# Patient Record
Sex: FEMALE | Race: WHITE | Employment: FULL TIME | URBAN - METROPOLITAN AREA
[De-identification: names, ages, dates, MRNs, and addresses within clinical notes are randomized per-mention and may not be internally consistent; named-entity substitution may affect disease eponyms.]

---

## 2021-04-27 PROBLEM — M51.36 DDD (DEGENERATIVE DISC DISEASE), LUMBAR: Status: ACTIVE | Noted: 2021-04-27

## 2021-04-27 PROBLEM — M54.16 LUMBAR RADICULOPATHY: Status: ACTIVE | Noted: 2021-04-27

## 2021-04-27 PROBLEM — E66.01 MORBID OBESITY (HCC): Status: ACTIVE | Noted: 2021-04-27

## 2021-05-05 ENCOUNTER — HOSPITAL ENCOUNTER (OUTPATIENT)
Dept: CT IMAGING | Age: 51
Discharge: HOME | End: 2021-05-05
Attending: NEUROLOGICAL SURGERY
Payer: COMMERCIAL

## 2021-05-05 ENCOUNTER — HOSPITAL ENCOUNTER (OUTPATIENT)
Dept: INTERVENTIONAL RADIOLOGY/VASCULAR | Age: 51
Discharge: HOME | End: 2021-05-05
Attending: NEUROLOGICAL SURGERY
Payer: COMMERCIAL

## 2021-05-05 VITALS
HEART RATE: 78 BPM | DIASTOLIC BLOOD PRESSURE: 76 MMHG | OXYGEN SATURATION: 99 % | TEMPERATURE: 97.4 F | RESPIRATION RATE: 18 BRPM | SYSTOLIC BLOOD PRESSURE: 169 MMHG

## 2021-05-05 DIAGNOSIS — E66.01 MORBID OBESITY (HCC): ICD-10-CM

## 2021-05-05 DIAGNOSIS — M51.36 DDD (DEGENERATIVE DISC DISEASE), LUMBAR: ICD-10-CM

## 2021-05-05 DIAGNOSIS — M54.16 LUMBAR RADICULOPATHY: ICD-10-CM

## 2021-05-05 PROCEDURE — 77030003666 HC NDL SPINAL BD -A

## 2021-05-05 PROCEDURE — 74011000250 HC RX REV CODE- 250: Performed by: PHYSICIAN ASSISTANT

## 2021-05-05 PROCEDURE — 62304 MYELOGRAPHY LUMBAR INJECTION: CPT

## 2021-05-05 PROCEDURE — 77030014143 HC TY PUNC LUMBR BD -A

## 2021-05-05 PROCEDURE — 74011000636 HC RX REV CODE- 636: Performed by: PHYSICIAN ASSISTANT

## 2021-05-05 PROCEDURE — 72132 CT LUMBAR SPINE W/DYE: CPT

## 2021-05-05 RX ORDER — LIDOCAINE HYDROCHLORIDE 20 MG/ML
1-10 INJECTION, SOLUTION INFILTRATION; PERINEURAL
Status: DISCONTINUED | OUTPATIENT
Start: 2021-05-05 | End: 2021-05-09 | Stop reason: HOSPADM

## 2021-05-05 RX ADMIN — LIDOCAINE HYDROCHLORIDE 4 ML: 20 INJECTION, SOLUTION INFILTRATION; PERINEURAL at 08:15

## 2021-05-05 RX ADMIN — IOPAMIDOL 15 ML: 408 INJECTION, SOLUTION INTRATHECAL at 08:17

## 2021-05-05 NOTE — DISCHARGE INSTRUCTIONS
[address]  Department of Interventional Radiology  [facility]  [phone] Office  [phone] Fax  POST LUMBAR PUNCTURE/MYELOGRAM/INTRATHECAL CHEMOTHERAPY DISCHARGE INSTRUCTIONS  General Information:  Lumbar Puncture: A LP is done to help diagnose several disorders, like pseudo tumor, migraines, meningitis, and multiple sclerosis. It involves a puncture (usually in the lower spine) into the sac that protects the spinal column. A sample of the fluid in that space is removed and tested in the lab. Myelogram:   A Myelogram involves a lumbar puncture, and instead of removing fluid, contrast will be injected into the sac surrounding the spinal column. It is done to visualize the spinal column, nerve roots, spinal canal, vertebral discs and disc space. It is usually done to diagnose back pain with unknown cause or in preparation for surgery. After the injection, a CT scan will be done, usually within two hours of the injection. Intrathecal Chemotherapy:   Chemotherapy can be given in many forms. Intrathecal chemo involves a lumbar puncture, and instead of removing fluid, the chemo will be injected into the space. After any of these procedures, you will be asked to lie flat on your back for 4-6 hours to prevent complications. You should also rest for 24 hours after you go home, and force fluids. If you have a headache, you should take Tylenol or acetaminophen. Call If:   You should call your Physician and/or the Radiology Nurse if you develop a headache that is not relieved by Tylenol, and worsens when you stand and eases when you lie down, you need to call. You may have developed what is referred to as a spinal headache. Our physicians will probably advise you to be on strict bed rest for 24 hours, to drink lots of fluids and caffeine. If this does not help the head pain, call again the next day.  You should call if you have bleeding other than a small spot on your bandage. You should call if you have any numbness, tingling, weakness, fever, chills, urinary retention, severe itching, rash, welts, swelling, or confusion. Follow-up Instructions: See the doctor who ordered your procedure as he/she has instructed. If you had a Lumbar Puncture or Myelogram, your results should be available to your ordering doctor in 3-5 business days. You can remove your dressing in 24 hours and shower regularly. Do not bathe or swim for 72 hours. To Reach Us: If you have any questions about your procedure, please call the Interventional Radiology department at [phone]. After business hours (5pm) and weekends, call the answering service at [phone] and ask for the Radiologist on call to be paged. Si tiene Preguntas acerca del procedimiento, por favor llame al departamento de Radiología Intervencional al [phone]. Después de horas de oficina (5 pm) y los fines de [place], llamar al Cornelius Cortez al [phone] y pregunte por el Radiologo de [facility]. Interventional Radiology General Nurse Discharge    After general anesthesia or intravenous sedation, for 24 hours or while taking prescription Narcotics:  · Limit your activities  · Do not drive and operate hazardous machinery  · Do not make important personal or business decisions  · Do  not drink alcoholic beverages  · If you have not urinated within 8 hours after discharge, please contact your surgeon on call. * Please give a list of your current medications to your Primary Care Provider. * Please update this list whenever your medications are discontinued, doses are     changed, or new medications (including over-the-counter products) are added. * Please carry medication information at all times in case of emergency situations.     These are general instructions for a healthy lifestyle:    No smoking/ No tobacco products/ Avoid exposure to second hand smoke  Surgeon Solis Howard Warning:  Quitting smoking now greatly reduces serious risk to your health. Obesity, smoking, and sedentary lifestyle greatly increases your risk for illness  A healthy diet, regular physical exercise & weight monitoring are important for maintaining a healthy lifestyle    You may be retaining fluid if you have a history of heart failure or if you experience any of the following symptoms:  Weight gain of 3 pounds or more overnight or 5 pounds in a week, increased swelling in our hands or feet or shortness of breath while lying flat in bed. Please call your doctor as soon as you notice any of these symptoms; do not wait until your next office visit. Recognize signs and symptoms of STROKE:  F-face looks uneven    A-arms unable to move or move unevenly    S-speech slurred or non-existent    T-time-call 911 as soon as signs and symptoms begin-DO NOT go       Back to bed or wait to see if you get better-TIME IS BRAIN.     Patient Signature:  Date: 5/5/2021  Discharging Nurse: Jared Carson RN

## 2021-05-05 NOTE — PROGRESS NOTES
TRANSFER - OUT REPORT:    Verbal report given to Tyron Ochoa RN(name) on [place]  being transferred to IR recovery(unit) for routine progression of care. Report consisted of patients Situation, Background, Assessment and   Recommendations(SBAR). Information from the following report(s) SBAR, Procedure Summary and MAR was reviewed with the receiving nurse. Opportunity for questions and clarification was provided. Pt tolerated procedure well.      Visit Vitals  BP (!) 169/76 (BP 1 Location: Right upper arm, BP Patient Position: At rest)   Pulse 78   Temp 97.4 °F (36.3 °C)   Resp 18   SpO2 99%   Breastfeeding No     Past Medical History:   Diagnosis Date    Celiac disease     Endometriosis     Headache     Lyme disease     Migraines     Vertigo

## 2021-05-05 NOTE — PROGRESS NOTES
Recovery period without difficulty. Pt alert and oriented and denies pain. Dressing is clean, dry, and intact. Reviewed discharge instructions with patient, verbalized understanding. Pt escorted to lobby discharge area via wheelchair. Vital signs and Ilene score completed.

## 2021-06-10 ENCOUNTER — TRANSCRIBE ORDER (OUTPATIENT)
Dept: SCHEDULING | Age: 51
End: 2021-06-10

## 2021-06-10 DIAGNOSIS — Z12.31 VISIT FOR SCREENING MAMMOGRAM: Primary | ICD-10-CM

## 2021-06-11 ENCOUNTER — TRANSCRIBE ORDER (OUTPATIENT)
Dept: SCHEDULING | Age: 51
End: 2021-06-11

## 2021-06-11 DIAGNOSIS — Z12.31 VISIT FOR SCREENING MAMMOGRAM: Primary | ICD-10-CM

## 2021-06-15 ENCOUNTER — HOSPITAL ENCOUNTER (OUTPATIENT)
Dept: MAMMOGRAPHY | Age: 51
Discharge: HOME | End: 2021-06-15
Payer: COMMERCIAL

## 2021-06-15 DIAGNOSIS — Z12.31 VISIT FOR SCREENING MAMMOGRAM: ICD-10-CM

## 2021-06-15 PROCEDURE — 77067 SCR MAMMO BI INCL CAD: CPT

## 2022-03-18 PROBLEM — M51.36 DDD (DEGENERATIVE DISC DISEASE), LUMBAR: Status: ACTIVE | Noted: 2021-04-27

## 2022-03-18 PROBLEM — M51.369 DDD (DEGENERATIVE DISC DISEASE), LUMBAR: Status: ACTIVE | Noted: 2021-04-27

## 2022-03-18 PROBLEM — M54.16 LUMBAR RADICULOPATHY: Status: ACTIVE | Noted: 2021-04-27

## 2022-03-19 PROBLEM — E66.01 MORBID OBESITY (HCC): Status: ACTIVE | Noted: 2021-04-27

## 2022-06-18 ENCOUNTER — HOSPITAL ENCOUNTER (OUTPATIENT)
Dept: MAMMOGRAPHY | Age: 52
Discharge: HOME | End: 2022-06-21
Payer: COMMERCIAL

## 2022-06-18 DIAGNOSIS — Z12.31 SCREENING MAMMOGRAM FOR HIGH-RISK PATIENT: ICD-10-CM

## 2022-06-18 PROCEDURE — 77067 SCR MAMMO BI INCL CAD: CPT

## 2022-06-23 ENCOUNTER — OFFICE VISIT (OUTPATIENT)
Dept: SURGERY | Age: 52
End: 2022-06-23
Payer: COMMERCIAL

## 2022-06-23 VITALS
DIASTOLIC BLOOD PRESSURE: 64 MMHG | BODY MASS INDEX: 45.99 KG/M2 | SYSTOLIC BLOOD PRESSURE: 125 MMHG | HEART RATE: 83 BPM | WEIGHT: 293 LBS | HEIGHT: 67 IN

## 2022-06-23 DIAGNOSIS — Z01.812 ENCOUNTER FOR PRE-OPERATIVE LABORATORY TESTING: ICD-10-CM

## 2022-06-23 DIAGNOSIS — M54.50 CHRONIC BILATERAL LOW BACK PAIN WITHOUT SCIATICA: ICD-10-CM

## 2022-06-23 DIAGNOSIS — G89.29 CHRONIC BILATERAL LOW BACK PAIN WITHOUT SCIATICA: ICD-10-CM

## 2022-06-23 DIAGNOSIS — Z87.19 HX OF GASTROESOPHAGEAL REFLUX (GERD): ICD-10-CM

## 2022-06-23 DIAGNOSIS — G43.919 INTRACTABLE MIGRAINE WITHOUT STATUS MIGRAINOSUS, UNSPECIFIED MIGRAINE TYPE: ICD-10-CM

## 2022-06-23 DIAGNOSIS — E66.01 MORBID OBESITY (HCC): ICD-10-CM

## 2022-06-23 DIAGNOSIS — I49.3 PVC (PREMATURE VENTRICULAR CONTRACTION): ICD-10-CM

## 2022-06-23 DIAGNOSIS — Z98.890 H/O SPINAL SURGERY: ICD-10-CM

## 2022-06-23 DIAGNOSIS — E66.01 MORBID OBESITY (HCC): Primary | ICD-10-CM

## 2022-06-23 DIAGNOSIS — A69.20 LYME DISEASE: ICD-10-CM

## 2022-06-23 DIAGNOSIS — G47.33 OSA (OBSTRUCTIVE SLEEP APNEA): ICD-10-CM

## 2022-06-23 DIAGNOSIS — E66.01 OBESITY, CLASS III, BMI 40-49.9 (MORBID OBESITY) (HCC): ICD-10-CM

## 2022-06-23 LAB
25(OH)D3 SERPL-MCNC: 31.7 NG/ML (ref 30–100)
FERRITIN SERPL-MCNC: 112 NG/ML (ref 8–388)
FOLATE SERPL-MCNC: 32.2 NG/ML (ref 3.1–17.5)
IRON SERPL-MCNC: 78 UG/DL (ref 35–150)
TSH, 3RD GENERATION: 1.74 UIU/ML (ref 0.36–3.74)
VIT B12 SERPL-MCNC: 829 PG/ML (ref 193–986)

## 2022-06-23 PROCEDURE — 99205 OFFICE O/P NEW HI 60 MIN: CPT | Performed by: SURGERY

## 2022-06-23 PROCEDURE — APPSS45 APP SPLIT SHARED TIME 31-45 MINUTES: Performed by: PHYSICIAN ASSISTANT

## 2022-06-24 ENCOUNTER — HOSPITAL ENCOUNTER (OUTPATIENT)
Dept: PHYSICAL THERAPY | Age: 52
Setting detail: RECURRING SERIES
Discharge: HOME | End: 2022-06-27
Payer: COMMERCIAL

## 2022-06-24 LAB
EST. AVERAGE GLUCOSE BLD GHB EST-MCNC: 111 MG/DL
HBA1C MFR BLD: 5.5 % (ref 4.2–6.3)

## 2022-06-24 PROCEDURE — 97161 PT EVAL LOW COMPLEX 20 MIN: CPT

## 2022-06-27 NOTE — THERAPY EVALUATION
Armando Campuzano  : 1970  Primary: [facility]  Secondary: [facility] [address] @ [address] [address]  Phone: [phone]  Fax: [phone] Plan Frequency: 1 visit for HEP. Plan of Care/Certification Expiration Date: 22      PT Visit Info: Total # of Visits to Date: 1      OUTPATIENT PHYSICAL THERAPY:OP NOTE TYPE: Initial Assessment and Discharge Summary 2022               Episode  Appt Desk         Treatment Diagnosis:  Generalized Muscle Weakness (M62.81)  Low Back Pain (M54.5)  Medical/Referring Diagnosis:  Morbid (severe) obesity due to excess calories [E66.01]  Referring Physician:  KESHA Sinha MD Orders:  PT Eval and Treat   Return MD Appt:  TBD  Date of Onset:  Chronic  Allergies:  Patient has no known allergies. Restrictions/Precautions:    Restrictions/Precautions: None  Medications Last Reviewed:  2022     SUBJECTIVE   History of Injury/Illness (Reason for Referral):  Pt reports she is thinking about the bariatric surgery. Currently she has difficulty exercising due to knee/back, hip pain. She does walk short distances occasionally around her neighborhood. Patient Stated Goal(s): \"Improve health\"  Initial:    10  /10 Post Session:    10  /10  Past Medical History/Comorbidities:   Ms. Ori Pinto  has a past medical history of Celiac disease, Endometriosis, Headache, Lyme disease, Migraines, and Vertigo. Ms. Ori Pinto  has a past surgical history that includes Cholecystectomy;  Salpingo-oophorectomy; back surgery; and tumor removal.  Social History/Living Environment:   Lives With: Family     Prior Level of Function/Work/Activity:   Prior level of function: mod I  Occupation: Full time employment    Learning:   Does the patient/guardian have any barriers to learning?: No barriers  Will there be a co-learner?: No  What is the preferred language of the patient/guardian?: English  Is an  required?: No  How does the patient/guardian prefer to learn new concepts?: Listening; Demonstration     Fall Risk Scale: Total Score: 40  Morgan Fall Risk: Medium (25-44)         OBJECTIVE   Observation/Orthostatic Postural Assessment: pt presents to physical therapy in no apparent distress  ROM: shoulder and knee ROM WFL     Strength: grossly 4/5     Functional Mobility:  Sit to/from Stand: modified independent. Walking on level ground: ambulates with wide base of support, decreased speed, decreased step length, guarded. ASSESSMENT   Initial Assessment:  Ms. Jesus Rivera presents to physical therapy in preparation for bariatric surgery. She currently has difficulty exercising due to pain in multiple joints. Recommended traditional physical therapy with an emphasis on aquatic therapy but patient was not interested at this time. Discussed trying aquatic exercises to help unload her joints and be able to walk in the water and hip and knee AROM in the water. Also intructed in UE exercises with resistance band that she was able to demonstrate. No further PT needs at this time. Problem List: (Impacting functional limitations): Body Structures, Functions, Activity Limitations Requiring Skilled Therapeutic Intervention: Decreased endurance; Decreased strength     Therapy Prognosis:   Therapy Prognosis: Fair     Assessment Complexity:   Low Complexity  PLAN   Effective Dates: 6-24-22 TO Plan of Care/Certification Expiration Date: 06/24/22     Frequency/Duration: Plan Frequency: 1 visit for HEP. Interventions Planned (Treatment may consist of any combination of the following):    Current Treatment Recommendations: Strengthening; Home exercise program     Goals: (Goals have been discussed and agreed upon with patient.)  Discharge Goals: Time Frame: 1 visit  1. Pt will be independent with HEP. Outcome Measure:    Tool Used: Lower Extremity Functional Scale (LEFS)  Score:  Initial: 23/80 Most Recent: X/80 (Date: -- )   Interpretation of Score: 20 questions each scored on a 5 point scale with 0 representing \"extreme difficulty or unable to perform\" and 4 representing \"no difficulty\". The lower the score, the greater the functional disability. 80/80 represents no disability. Minimal detectable change is 9 points. Total Duration: 30 minutes  Time In: 6062  Time Out: 3834    Regarding Silva Kyle's therapy, I certify that the treatment plan above will be carried out by a therapist or under their direction.   Thank you for this referral,  Edith English, PT     Referring Physician Signature: KESHA Ramirez _______________________________ Date _____________        Post Session Pain  Charge Capture  PT Visit Info  POC Link  Treatment Note Link  MD Guidelines  MyChart

## 2022-06-29 ENCOUNTER — HOSPITAL ENCOUNTER (OUTPATIENT)
Dept: GENERAL RADIOLOGY | Age: 52
Discharge: HOME | End: 2022-07-02
Payer: COMMERCIAL

## 2022-06-29 DIAGNOSIS — Z87.19 HX OF GASTROESOPHAGEAL REFLUX (GERD): ICD-10-CM

## 2022-06-29 DIAGNOSIS — E66.01 MORBID OBESITY (HCC): ICD-10-CM

## 2022-06-29 DIAGNOSIS — Z01.812 ENCOUNTER FOR PRE-OPERATIVE LABORATORY TESTING: ICD-10-CM

## 2022-06-29 LAB
COTININE SERPL-MCNC: <1 NG/ML
NICOTINE SERPL-MCNC: <1 NG/ML

## 2022-06-29 PROCEDURE — 74246 X-RAY XM UPR GI TRC 2CNTRST: CPT

## 2022-06-29 PROCEDURE — 6370000000 HC RX 637 (ALT 250 FOR IP): Performed by: PHYSICIAN ASSISTANT

## 2022-06-29 PROCEDURE — 2500000003 HC RX 250 WO HCPCS: Performed by: PHYSICIAN ASSISTANT

## 2022-06-29 RX ADMIN — BARIUM SULFATE 140 ML: 980 POWDER, FOR SUSPENSION ORAL at 10:15

## 2022-06-29 RX ADMIN — ANTACID/ANTIFLATULENT 1 EACH: 380; 550; 10; 10 GRANULE, EFFERVESCENT ORAL at 10:14

## 2022-07-21 ENCOUNTER — OFFICE VISIT (OUTPATIENT)
Dept: ORTHOPEDIC SURGERY | Age: 52
End: 2022-07-21
Payer: COMMERCIAL

## 2022-07-21 DIAGNOSIS — M25.561 PAIN IN BOTH KNEES, UNSPECIFIED CHRONICITY: Primary | ICD-10-CM

## 2022-07-21 DIAGNOSIS — M25.562 PAIN IN BOTH KNEES, UNSPECIFIED CHRONICITY: Primary | ICD-10-CM

## 2022-07-21 DIAGNOSIS — M22.2X1 PATELLOFEMORAL PAIN SYNDROME OF BOTH KNEES: ICD-10-CM

## 2022-07-21 DIAGNOSIS — M22.2X2 PATELLOFEMORAL PAIN SYNDROME OF BOTH KNEES: ICD-10-CM

## 2022-07-21 DIAGNOSIS — M17.0 PRIMARY OSTEOARTHRITIS OF BOTH KNEES: ICD-10-CM

## 2022-07-21 PROCEDURE — 99204 OFFICE O/P NEW MOD 45 MIN: CPT | Performed by: PHYSICIAN ASSISTANT

## 2022-07-21 RX ORDER — DICLOFENAC SODIUM 75 MG/1
75 TABLET, DELAYED RELEASE ORAL 2 TIMES DAILY
Qty: 28 TABLET | Refills: 0 | Status: ON HOLD | OUTPATIENT
Start: 2022-07-21 | End: 2022-10-11 | Stop reason: HOSPADM

## 2022-07-22 ENCOUNTER — INITIAL CONSULT (OUTPATIENT)
Dept: CARDIOLOGY CLINIC | Age: 52
End: 2022-07-22
Payer: COMMERCIAL

## 2022-07-22 VITALS
BODY MASS INDEX: 45.99 KG/M2 | HEIGHT: 67 IN | HEART RATE: 69 BPM | SYSTOLIC BLOOD PRESSURE: 110 MMHG | DIASTOLIC BLOOD PRESSURE: 82 MMHG | WEIGHT: 293 LBS

## 2022-07-22 DIAGNOSIS — G47.33 OBSTRUCTIVE SLEEP APNEA SYNDROME: ICD-10-CM

## 2022-07-22 DIAGNOSIS — Z01.810 PREOPERATIVE CARDIOVASCULAR EXAMINATION: Primary | ICD-10-CM

## 2022-07-22 DIAGNOSIS — E66.01 MORBID OBESITY (HCC): ICD-10-CM

## 2022-07-22 DIAGNOSIS — Z76.89 ENCOUNTER TO ESTABLISH CARE: ICD-10-CM

## 2022-07-22 PROBLEM — G47.30 SLEEP APNEA: Status: ACTIVE | Noted: 2022-05-18

## 2022-07-22 PROCEDURE — 99204 OFFICE O/P NEW MOD 45 MIN: CPT | Performed by: INTERNAL MEDICINE

## 2022-07-22 PROCEDURE — 93000 ELECTROCARDIOGRAM COMPLETE: CPT | Performed by: INTERNAL MEDICINE

## 2022-07-22 ASSESSMENT — ENCOUNTER SYMPTOMS
STRIDOR: 0
HEMOPTYSIS: 0
ABDOMINAL PAIN: 0
HEMATEMESIS: 0
HEMATOCHEZIA: 0
WHEEZING: 0
EYE REDNESS: 0
HOARSE VOICE: 0
DOUBLE VISION: 0

## 2022-07-22 NOTE — PROGRESS NOTES
[facility] CARDIOLOGY  [address] [facility], [address]  PHONE: [phone]          22    NAME:  Eun Wells  : 1970  MRN: [id number]         SUBJECTIVE:   Eun Wells is a 54 y.o. female seen for a visit regarding the following:     Chief Complaint   Patient presents with    Cardiac Clearance           HPI:    Cardio problem list:  1. Preoperative cardiovascular exam  2. Morbid obesity  3. Obstructive sleep apnea  4. Palpitations-history of PACs and PVCs in the past    Dear Pauline Griggs and Dr. Gera Mello,  I saw Ms. Alexandre aMnriquez who is a pleasant 53-year-old woman in cardiovascular consultation on 2022 for preoperative cardiovascular exam.  She has plans to undergo bariatric surgery within the near future. She has underlying sleep apnea and obesity as risk factors for coronary artery disease. Preoperative exam: No complaints of any chest pain or any significant worsening dyspnea on exertion orthopnea or PND. She says she uses her CPAP machine usually but lately needs parts for the machine and is awaiting it. Denies any excessive daytime drowsiness or fatigue at this point. No previous history of hypertension or diabetes or hyperlipidemia and no strong family history of premature coronary artery disease or sudden cardiac death. Baseline EKG shows sinus rhythm with no concerning findings apart from right axis deviation which is likely from her underlying sleep apnea and right heart strain from it. No significant lower extremity edema for abdominal distention no pleural effusions    Palpitations: Has had occasional PVCs and PACs in the past which was diagnosed while she was in [facility]. Past Medical History, Past Surgical History, Family history, Social History, and Medications were all reviewed with the patient today and updated as necessary.      No Known Allergies  Patient Active Problem List   Diagnosis    DDD (degenerative disc disease), lumbar    Lumbar radiculopathy    Morbid obesity ([address]    Sleep apnea     Past Medical History:   Diagnosis Date    Celiac disease     Endometriosis     Headache     Lyme disease     Migraines     Vertigo      Past Surgical History:   Procedure Laterality Date    BACK SURGERY      CHOLECYSTECTOMY      SALPINGO-OOPHORECTOMY      TUMOR REMOVAL       Family History   Problem Relation Age of Onset    Stroke Mother         TIA    Asthma Mother     Osteoarthritis Mother     Lupus Mother     Kidney Disease Mother     Seizures Mother     Headache Mother     Hypertension Mother     Heart Disease Mother     Lung Disease Mother     Hypertension Father     Diabetes Father     Hypertension Paternal Grandmother     Heart Attack Paternal Grandfather 76    Breast Cancer Neg Hx      Social History     Tobacco Use    Smoking status: Never    Smokeless tobacco: Never   Substance Use Topics    Alcohol use: Never     Current Outpatient Medications   Medication Sig Dispense Refill    B Complex Vitamins (VITAMIN B COMPLEX PO) Take 1 tablet by mouth daily      MAGNESIUM-POTASSIUM PO Take by mouth      diclofenac (VOLTAREN) 75 MG EC tablet Take 1 tablet by mouth in the morning and 1 tablet before bedtime. Do all this for 14 days. Take 1 tablet BID for 2 weeks as needed for pain. 28 tablet 0    calcium citrate (CALCITRATE) 950 (200 Ca) MG tablet Take by mouth daily      carbonyl iron (FEOSOL) 45 MG TABS Take 1 tablet by mouth daily      vitamin D3 (CHOLECALCIFEROL) 125 MCG (5000 UT) TABS tablet Take 5,000 Units by mouth daily       No current facility-administered medications for this visit. Review of Systems   Constitutional: Negative for chills and fever. HENT:  Negative for ear discharge, hoarse voice and stridor. Eyes:  Negative for double vision and redness. Cardiovascular:  Negative for cyanosis and syncope. Respiratory:  Negative for hemoptysis and wheezing. Endocrine: Negative for polydipsia and polyphagia.    Hematologic/Lymphatic: Negative for adenopathy. Skin:  Negative for itching and rash. Musculoskeletal:  Negative for joint swelling and muscle weakness. Gastrointestinal:  Negative for abdominal pain, hematemesis and hematochezia. Genitourinary:  Negative for flank pain and nocturia. Neurological:  Negative for focal weakness and seizures. Psychiatric/Behavioral:  Negative for altered mental status and suicidal ideas. Allergic/Immunologic: Negative for hives. PHYSICAL EXAM:    /82   Pulse 69   Ht 5' 7\" (1.702 m)   Wt (!) 302 lb (137 kg)   LMP 06/22/2022   BMI 47.30 kg/m²      Physical Exam    General: Alert and oriented in no acute distress, morbidly obese  HEENT: Head is normocephalic, atraumatic, pupils are equal bilaterally, throat appears to be clear  Neck: No significant jugular venous distention no cervical bruits  Cardiovascular: S1 and S2 heard, regular rate and rhythm, no significant murmurs rubs or gallops. Respiratory: Clear to auscultation bilaterally with no adventitious sounds, respirations are normal  Abdomen: Soft, nontender, nondistended, bowel sounds present. Extremities: No cyanosis clubbing or edema  Peripheral pulses: Bilateral radial artery pulses are palpated. Bilateral pedal pulses are well felt. Neuro: No facial droop and no gross focal motor deficits  Lymphatic: No significant cervical lymphadenopathy noted. Musculoskeletal: No significant redness or swelling noted in all exposed joints. Skin: No significant rashes noted the of the exposed regions. Medical problems and test results were reviewed with the patient today. No results found for this or any previous visit (from the past 672 hour(s)).   No results found for: CHOL, CHOLPOCT, CHOLX, CHLST, CHOLV, HDL, HDLPOC, HDLC, LDL, LDLC, VLDLC, VLDL, TGLX, TRIGL,hemoglobin, basic metabolic panel, No results found for: TSH, TSH2, TSH3 ,No results found for: NA, K, CL, CO2, AGAP, GLU, BUN, CREA, GFRAA, CA, TP, ALB, GLOB, ALT, AST   No results found for: LDLCALC, LDLCHOLESTEROL, LDLDIRECT   No results found for: CREATININE   No results found for this or any previous visit. ASSESSMENT and PLAN      Preoperative cardiovascular examination  -     EKG 12 lead  -No active cardiac conditions. Baseline functional capacity is greater than 4 metabolic equivalents. Plans for bariatric surgery-intermediate risk type of surgery but she would be at low risk for major adverse cardiovascular outcomes for this type of surgery. Morbid obesity ([address]  -Agree with her following a low-carb diet with the need for weight loss. Bariatric surgery would be beneficial.    Obstructive sleep apnea syndrome  -Counseled on importance of being compliant with CPAP therapy. Encounter to establish care  -     EKG 12 lead       Overall Impression  -No active cardiac conditions. Okay to proceed with bariatric surgery with low risk for major adverse cardiovascular outcomes for this intermediate risk type of surgery. She would only need to see us in follow-up on an as-needed basis. -Baseline EKG with right axis deviation from underlying sleep apnea and treatment of this would be beneficial long-term. Weight loss could be extremely beneficial for her sleep apnea in addition. Return if symptoms worsen or fail to improve. Thank you Wolfgang and Dr. Karen Terry for allowing us to participate in the care of your patient. If you have any further questions, please do not hesitate to contact us.   Sincerely,        Kathie Munson MD   7/22/2022

## 2022-07-24 NOTE — PROGRESS NOTES
James Madden PA-C  Bariatric & Advanced Laparoscopic Surgery & Endoscopy  [place] [address], [address] [facility]  Deisy Cookjohny Farmer  Phone [phone]   Fax [phone]    Date of visit: 2022      Primary/Requesting provider: SANFORD Garrett Aas, NP         Name: Gideon Pena      MRN: [id number]       : 1970       Age: 54 y.o. Sex: female        PCP: SANFORD Garrett Aas, NP     CC:  Bariatric monthly dietary follow up    Surgeon: Dr. Myra Narvaez    Procedure: laparoscopic sleeve gastrectomy    Surgical Consult Date: 2022    The patient is a 54 y.o. female presenting with a height of 5' 7\" (1.702 m) and weight of (!) 303 lb (137.4 kg), giving her a Body mass index is 47.46 kg/m². She has an ideal body weight of 159 lbs, and excess body weight of 144 lbs. She started our program with a weight of 302 lbs, gained 1 lbs    She is in the process of completing all aspects of our prep program and to be deemed an acceptable candidate for bariatric surgery. Patient has a long term history of obesity with multiple failed attempts at weight reduction. Patient denies any changes in health history or physical health since last visit. Patient has been adherent to her diet and exercise regimen. Patient feels that she is well informed regarding her bariatric surgery choice and would like to proceed with a laparoscopic sleeve gastrectomy for weight reduction, improvement of her medical conditions, and improved quality of life. Patient verbalized understanding of the risks associated with bariatric surgery. Patient also verbalized understanding that bariatric surgery is a tool and that weight reduction is dependent on behavioral changes in regards to what she eats and how much.     NUTRITION ASSESSMENT:   Diet Recall:   Breakfast: 1 cup cereal with 2% lactaid milk  Lunch: gf macaroni salad 1 cup  Snack: raisins, nectarine  Dinner: bbq chicken thighs x2, 3tbsp macaroni salad, 1/4 cup cucumber and tomato salad, watermelon  Snack: nectarine, maulik's pb cup  Beverages: water  Habits:   Eating 3x/day: yes  Elimination of caffeine and carbonated beverages: yes  Practicing not drinking with meals: working on this     EXERCISE ASSESSMENT: Pt is currently exercising via PT exercises. NIH Guidelines reviewed with pt. PSYCHOLOGICAL EVALUATION:  Scheduled, 08/11/2022 with Nidia Briggs  DIETITIAN EVALUATION:  In progress with Nessa Marie RD, LD  BARIATRIC LABS:  Completed, 06/23/2022 (folate elevated)  CARDIAC CLEARANCE:  Completed, 07/22/2022  Okay to proceed with bariatric surgery with low risk for major adverse cardiovascular outcomes for this intermediate risk type of surgery. RHEUMATOLOGY CLEARANCE:  Not completed  UPPER GI:  Completed, 06/29/2022  1. Multiple episodes of gastroesophageal reflux. 2. Otherwise, unremarkable upper GI examination. PHYSICAL THERAPY EVALUATION FOR MOBILITY OPTIMIZATION:  Completed, 06/24/2022    MEDICAL HISTORY:  Morbid Obesity  Degenerative disc disease  Chronic pain  PVC/PAC - no meds or treatment  Migraines  Palpitations  Lymphedema  Hx of COVID-19 - Mar 2021, Feb 2022     Comorbidity Yes or No   Hypertension No   Hyperlipidemia No   Diabetes Mellitus No   Coronary Artery Disease No   Gastroesophageal Reflux  Treatment Med = Prilosec Yes   Obstructive Sleep Apnea Yes   Cancer No   Asthma No   Osteoarthritis Yes   Joint Pain Yes      Admits to GERD symptoms since she was a teenager, including heartburn and occasional regurgitation. Feels that it is mostly food related, and takes Prilosec as needed for her symptoms. She reports heartburn with specific foods. She also reports regurgitation. No previous EGD.      Other Yes or No   Venous Stasis No   Dialysis No   Long term use of steroid or immunocompromised conditions No   On Anticoagulants No      PMH:  Past Medical History:   Diagnosis Date    Celiac disease     Endometriosis No guarding or rebound. No palpable masses. Labs: All recent labs were reviewed. Lab Results   Component Value Date    LABA1C 5.5 06/23/2022     No results found for: TSHFT4, TSH   Lab Results   Component Value Date    VITD25 31.7 06/23/2022      Lab Results   Component Value Date    YBTRRQTB62 172 06/23/2022      Lab Results   Component Value Date    IRON 78 06/23/2022    FERRITIN 112 06/23/2022        Imaging: All images were independently reviewed by me. Diagnosis Orders   1. Morbid obesity ([address]        2. Obesity, Class III, BMI 40-49.9 (morbid obesity) ([address]        3. Hx of gastroesophageal reflux (GERD)        4. GLO (obstructive sleep apnea)        5. PVC (premature ventricular contraction)        6. Intractable migraine without status migrainosus, unspecified migraine type        7. Lyme disease        8. Chronic bilateral low back pain without sciatica        9. H/O Spinal surgery        10. Dietary counseling        11. Exercise counseling            Assessment/Plan:    Kalli Cha is a 54 y.o. female is here for monthly follow-up visit prior to bariatric surgery with medical co-morbidities related to morbid obesity. Patient is a fair candidate for bariatric surgery and meets NIH criteria for weight loss surgery. In detail, discussed risks and benefits of laparoscopic sleeve gastrectomy. Reviewed information and expectations regarding the pre-operative period, the bariatric procedure, and postoperative period. Stressed with patient importance of understanding bariatric surgery is a tool and will not work if patient does not continue with healthy lifestyle changes including regular exercise and high protein, low calorie, low carb diet. Patient was seen by the dietitian today for continued evaluation and management regarding lifestyle and dietary changes. Reviewed assessment and plan in detail. Patient verbalized understanding. Questions answered.     Recommendations: Continue to work on dietary changes over the next couple of weeks, follow up in one month. Complete remaining work up requirements as listed. Luis Daniel Mcdonald PA-C  Bariatric Surgery  7/28/2022    Counseling time:counseling time more than 50% of visit: 45 minutes: I spent this time preparing to see patient (including chart review and preparation), obtaining and/or reviewing additional medical history, performing a physical exam and evaluation, documenting clinical information in the electronic health record, independently interpreting results, communicating results to patient, family or caregiver, and/or coordinating care.

## 2022-07-25 NOTE — PROGRESS NOTES
Name: Magalis Galvan  YOB: 1970  Gender: female  MRN: [id number]    CC:   Chief Complaint   Patient presents with    Knee Pain     Bilateral knee pain       , Bilateral knee pain and stiffness     HPI:  This patient presents with a chronic month history of Right and left knee pain. It is insidious in onset. No history of trauma. The pain is activity related. Bending and twisting make it worse. Patient notes turning in bed makes it worse. She does have occasional numbness and tingling and was previously on Lyrica for this. Notes most pain is located in the anterior and patellar aspect and bothers her when going from sitting to standing. No prior treatment. She also has known spine issues which create lower extremity weakness as well.   No Known Allergies  Past Medical History:   Diagnosis Date    Celiac disease     Endometriosis     Headache     Lyme disease     Migraines     Vertigo      Past Surgical History:   Procedure Laterality Date    BACK SURGERY      CHOLECYSTECTOMY      SALPINGO-OOPHORECTOMY      TUMOR REMOVAL       Family History   Problem Relation Age of Onset    Stroke Mother         TIA    Asthma Mother     Osteoarthritis Mother     Lupus Mother     Kidney Disease Mother     Seizures Mother     Headache Mother     Hypertension Mother     Heart Disease Mother     Lung Disease Mother     Hypertension Father     Diabetes Father     Hypertension Paternal Grandmother     Heart Attack Paternal Grandfather 76    Breast Cancer Neg Hx      Social History     Socioeconomic History    Marital status: Single     Spouse name: Not on file    Number of children: Not on file    Years of education: Not on file    Highest education level: Not on file   Occupational History    Not on file   Tobacco Use    Smoking status: Never    Smokeless tobacco: Never   Substance and Sexual Activity    Alcohol use: Never    Drug use: Not on file    Sexual activity: Not on file   Other Topics Concern    Not on file   Social History Narrative    Not on file     Social Determinants of Health     Financial Resource Strain: Not on file   Food Insecurity: Not on file   Transportation Needs: Not on file   Physical Activity: Not on file   Stress: Not on file   Social Connections: Not on file   Intimate Partner Violence: Not on file   Housing Stability: Not on file        No flowsheet data found. Review of Systems  Noncontributory    Also noted on the patient medical history form in the chart and are reviewed today. Pertinent positives and negatives are addressed with the patient, particularly those related to musculoskeletal concerns. Non-orthopaedic concerns were referred back to the primary care physician. PE:  General appearance is that of a healthy patient, alert and oriented, NAD. Neck shows no abnormalities. Back and bilateral hips show no significant abnormalities and good ROM. No referral to LE with movement. No TTP to bilateral hips. R and L upper extremities show no significant abnormalities at this time. Both calves are soft. Dorsalis pedis pulses are 2+ and symmetrical.    Motor and sensory exam is intact and equal in both feet. KNEE Right Left   Skin Intact Intact   Atrophy Mild Mild   Effusion 1+ 1+   ROM  Full Full   Strength Mild weakness Mild weakness   Palpation Tender to palpate medial, lateral joint lines. Tender palpate lateral patella facet Diffusely tender   Patellar Tracking Tracks well Tracks well   Crepitus 1+ 1+   Patellar Apprehension Not performed Not tested   Instability None None   Gait Antalgic        X-rays:   AP, lateral, PA 45 degree flexion and merchant views of Right and left knee obtained in the office today show no evidence of fracture, dislocation or loose body. There is patella tilt with small osteophyte formation in the patellofemoral joint    X-ray Impression: Right and left knee lateral compression syndrome    Assessment:     ICD-10-CM    1.  Pain in both knees, unspecified chronicity  M25.561 Ambulatory referral to Physical Therapy    M25.562 diclofenac (VOLTAREN) 75 MG EC tablet     CANCELED: XR KNEE RIGHT (MIN 4 VIEWS)      2. Patellofemoral pain syndrome of both knees  M22.2X1 Ambulatory referral to Physical Therapy    M22.2X2 diclofenac (VOLTAREN) 75 MG EC tablet      3. Primary osteoarthritis of both knees  M17.0 Ambulatory referral to Physical Therapy     diclofenac (VOLTAREN) 75 MG EC tablet          PLAN:  I had a long discussion with the patient regarding the natural history of the problem, as well as treatment options. I discussed with the patient that she is having some patellofemoral syndrome with very minimal early degenerative changes. We discussed the importance of physical therapy and the natural progression of this syndrome. Discussed with patient BMI and decreasing BMI decreasing the force going through her knee. We also discussed 2-week of oral anti-inflammatories consistently. She states she is previously had diclofenac and did well with this. We will have her follow-up in 6 weeks for repeat evaluation and discussion of further intervention. TREATMENT:   Recommend therapy to evaluate and treat current complaints and pathology. A home exercise program was also discussed with the patient. Patient prescribed with Non-steroidal anti-inflammatories after review of risks and benefits. We discussed additional activity modification to help reduce pain for initial conservative treatment. Return in about 6 weeks (around 9/1/2022). Yo Hi  07/25/22     Thank you for the opportunity to see and take care of your patients. If you ever have any questions please give me a call.  Sincerely,  KESHA Hi  07/25/22

## 2022-07-28 ENCOUNTER — OFFICE VISIT (OUTPATIENT)
Dept: SURGERY | Age: 52
End: 2022-07-28

## 2022-07-28 VITALS
HEIGHT: 67 IN | WEIGHT: 293 LBS | BODY MASS INDEX: 45.99 KG/M2 | DIASTOLIC BLOOD PRESSURE: 74 MMHG | HEART RATE: 95 BPM | SYSTOLIC BLOOD PRESSURE: 137 MMHG

## 2022-07-28 DIAGNOSIS — G47.33 OSA (OBSTRUCTIVE SLEEP APNEA): ICD-10-CM

## 2022-07-28 DIAGNOSIS — Z98.890 H/O SPINAL SURGERY: ICD-10-CM

## 2022-07-28 DIAGNOSIS — Z71.82 EXERCISE COUNSELING: ICD-10-CM

## 2022-07-28 DIAGNOSIS — E66.01 MORBID OBESITY (HCC): Primary | ICD-10-CM

## 2022-07-28 DIAGNOSIS — Z87.19 HX OF GASTROESOPHAGEAL REFLUX (GERD): ICD-10-CM

## 2022-07-28 DIAGNOSIS — A69.20 LYME DISEASE: ICD-10-CM

## 2022-07-28 DIAGNOSIS — G89.29 CHRONIC BILATERAL LOW BACK PAIN WITHOUT SCIATICA: ICD-10-CM

## 2022-07-28 DIAGNOSIS — I49.3 PVC (PREMATURE VENTRICULAR CONTRACTION): ICD-10-CM

## 2022-07-28 DIAGNOSIS — Z71.3 DIETARY COUNSELING: ICD-10-CM

## 2022-07-28 DIAGNOSIS — G43.919 INTRACTABLE MIGRAINE WITHOUT STATUS MIGRAINOSUS, UNSPECIFIED MIGRAINE TYPE: ICD-10-CM

## 2022-07-28 DIAGNOSIS — M54.50 CHRONIC BILATERAL LOW BACK PAIN WITHOUT SCIATICA: ICD-10-CM

## 2022-07-28 DIAGNOSIS — E66.01 OBESITY, CLASS III, BMI 40-49.9 (MORBID OBESITY) (HCC): ICD-10-CM

## 2022-07-28 PROBLEM — M96.1 FAILED BACK SURGICAL SYNDROME: Status: ACTIVE | Noted: 2021-10-06

## 2022-07-28 PROCEDURE — 99215 OFFICE O/P EST HI 40 MIN: CPT | Performed by: PHYSICIAN ASSISTANT

## 2022-07-28 RX ORDER — B-COMPLEX WITH VITAMIN C
TABLET ORAL
COMMUNITY

## 2022-07-28 RX ORDER — ZONISAMIDE 50 MG/1
50 CAPSULE ORAL AS NEEDED
COMMUNITY
Start: 2021-10-06

## 2022-07-28 RX ORDER — MAGNESIUM GLUCONATE 30 MG(550)
TABLET ORAL
COMMUNITY

## 2022-07-28 NOTE — PROGRESS NOTES
Talia Weinberg, MS, RD, LD  Surgical Weight Loss Dietitian  [address], [address] [facility]  Arpita Cook  Phone [phone]   Fax [phone]    [facility] INITIAL ASSESSMENT    Nutrition Assessment:  Anthropometrics:    Ht: 57, wt: 303#, 191% IBW, 47.46 BMI  Pt has gained 1 lbs since last visit. Co-morbidities: GERD, GLO, OA Joint pain    Labs:   Vit D, 25-Hydroxy (ng/mL)   Date Value   06/23/2022 31.7     Folate (ng/mL)   Date Value   06/23/2022 32.2 (H)     Iron (ug/dL)   Date Value   06/23/2022 78     Ferritin (NG/ML)   Date Value   06/23/2022 112      Pt is currently taking a Vitamin D supplement per pt. Macronutrient needs assessment   EER: 8250-8350 kcal/d (14-16 kcal/kg ABW)    MSJ x 1.3-500 = 2146 kcal/d (sedentary AF)   EPR: 61-92g pro (1.0-1.5 gm pro/kg IBW)   CHO: ~268 g CHO/d (50% EER, ~6 servings CHO/meal)   H2O: 1mL/kcal or per MD recommendations     Support:  Pt has told parents, friends, sister - to be good support. Reminded pt about SWL support group and online support group. Motivation:  High. Onset of obesity: Adolescence   Potential triggers to weight gain: yoyo dieting, Lyme's disease, hereditary obesity, medication (lyrica)   Hx of obesity in family members include mother, aunts, grandmothers. History of Weight loss attempts, goal >10# weight loss:    Prior bariatric programs: none    Commercial programs: weightwatchers, [employer]    Registered Dietitian visits: none    Medical Weight Loss programs, including medications: peri OTC    Self-supervised diets and exercise: vigorous exercise, low-calorie, low-carb   Pt has attempted weight loss via modalities listed above - ~10+ attempts made, all without any permanent weight loss. Pt goal in pursuing SWL is pressure relief on joints, better health, reduced risks for comorbidities.       Eating Habits:   Eating occasions/d: 5-6  Main cook at home: pt  Restaurant/Fast Food Intake: 2x/month  Food Allergies: gluten, peppers  Cultural Preferences: none  Typical Beverage Consumption: water  Diet Recall:   Breakfast: 1 cup cereal with 2% lactaid milk  Lunch: gf macaroni salad 1 cup  Snack: raisins, nectarine  Dinner: bbq chicken thighs x2, 3tbsp macaroni salad, 1/4 cup cucumber and tomato salad, watermelon  Snack: nectarine, maulik's pb cup  Beverages: water  Feeling after eating meals: Comfortable or Can eat more  Habits:   Eating mindfully: Yes   Drinking after meals: practicing, waiting to goal of 30 minutes   Elimination of sugary/carbonated/caffeinated/alcoholic beverages: yes   Drinking without straws: yes    Lifestyle Assessment:    Hours of sleep/night: ~4-5, interrupted   Current Occupation: missionary   Activity during day: light   Type of Housing: house    Number of people living in house and influence: 3, including pt and parents - to be good influence. Exercise Assessment:   PAR-Q: none   Exercise equipment at home: exercise ball and resistance bands   Gym Membership: none - gym at [address] [place] [address]:     Pain or injuries (present): spinal degeneration, bilateral knee pain    Past surgeries related to mobility: lower back - herniated disk repair  Current Exercise Routine: aerobic walking and pt exercises for 7 minutes 4x/week  Assessment Exercise Goal: balancing and weight resistance exercises for 30 minutes 4d/week    Nutrition Diagnosis:  Morbid obesity R/T excessive energy intake and yoyo dieting as evidenced by BMI = 47.46 and 191 % IBW. Nutrition Intervention:   Diet Rx - Low-moderate calorie intake (~2100 kcal/day). Work on eating 3 meals/d and meal balance. Modify distribution, type or amount of food and nutrients within meals or at a specified time-    Discussed need for 3 meals per day and snacks based on body size. Explained macronutrients and emphasized mindful eating behaviors. Discussed meal priority and encouraged practice.    Educated on protein counting and encouraged practice  Pt goal to increasing hydrating fluid intake. Discussed protein and MVI supplements for use after sx. *Pt verbalizes understanding of information provided during this session. Nutrition Monitoring and Evaluation:   Monitor for understanding of diet and exercise rx. Follow up for practicing mindful eating behaviors and meal priority.    F/U 1 month      Portillo Da Silva, MS, RD, LD

## 2022-08-02 ENCOUNTER — TELEPHONE (OUTPATIENT)
Dept: ORTHOPEDIC SURGERY | Age: 52
End: 2022-08-02

## 2022-08-11 ENCOUNTER — OFFICE VISIT (OUTPATIENT)
Dept: BEHAVIORAL/MENTAL HEALTH CLINIC | Facility: CLINIC | Age: 52
End: 2022-08-11

## 2022-08-11 ENCOUNTER — TELEPHONE (OUTPATIENT)
Dept: SURGERY | Age: 52
End: 2022-08-11

## 2022-08-11 DIAGNOSIS — F54 PSYCHOLOGICAL AND BEHAVIORAL FACTORS ASSOCIATED WITH DISORDERS OR DISEASES CLASSIFIED ELSEWHERE: Primary | ICD-10-CM

## 2022-08-11 DIAGNOSIS — E66.01 OBESITY, MORBID, BMI 40.0-49.9 (HCC): ICD-10-CM

## 2022-08-11 PROCEDURE — 90791 PSYCH DIAGNOSTIC EVALUATION: CPT | Performed by: SOCIAL WORKER

## 2022-08-11 ASSESSMENT — LIFESTYLE VARIABLES
HOW MANY STANDARD DRINKS CONTAINING ALCOHOL DO YOU HAVE ON A TYPICAL DAY: PATIENT DOES NOT DRINK
HOW OFTEN DO YOU HAVE A DRINK CONTAINING ALCOHOL: NEVER

## 2022-08-11 ASSESSMENT — ANXIETY QUESTIONNAIRES
7. FEELING AFRAID AS IF SOMETHING AWFUL MIGHT HAPPEN: 0
2. NOT BEING ABLE TO STOP OR CONTROL WORRYING: 0
4. TROUBLE RELAXING: 0
3. WORRYING TOO MUCH ABOUT DIFFERENT THINGS: 0
6. BECOMING EASILY ANNOYED OR IRRITABLE: 0
GAD7 TOTAL SCORE: 0
5. BEING SO RESTLESS THAT IT IS HARD TO SIT STILL: 0
1. FEELING NERVOUS, ANXIOUS, OR ON EDGE: 0

## 2022-08-11 ASSESSMENT — PATIENT HEALTH QUESTIONNAIRE - PHQ9
4. FEELING TIRED OR HAVING LITTLE ENERGY: 3
SUM OF ALL RESPONSES TO PHQ QUESTIONS 1-9: 4
3. TROUBLE FALLING OR STAYING ASLEEP: 1
SUM OF ALL RESPONSES TO PHQ QUESTIONS 1-9: 4
8. MOVING OR SPEAKING SO SLOWLY THAT OTHER PEOPLE COULD HAVE NOTICED. OR THE OPPOSITE, BEING SO FIGETY OR RESTLESS THAT YOU HAVE BEEN MOVING AROUND A LOT MORE THAN USUAL: 0
7. TROUBLE CONCENTRATING ON THINGS, SUCH AS READING THE NEWSPAPER OR WATCHING TELEVISION: 0
9. THOUGHTS THAT YOU WOULD BE BETTER OFF DEAD, OR OF HURTING YOURSELF: 0
1. LITTLE INTEREST OR PLEASURE IN DOING THINGS: 0
6. FEELING BAD ABOUT YOURSELF - OR THAT YOU ARE A FAILURE OR HAVE LET YOURSELF OR YOUR FAMILY DOWN: 0
10. IF YOU CHECKED OFF ANY PROBLEMS, HOW DIFFICULT HAVE THESE PROBLEMS MADE IT FOR YOU TO DO YOUR WORK, TAKE CARE OF THINGS AT HOME, OR GET ALONG WITH OTHER PEOPLE: 0
SUM OF ALL RESPONSES TO PHQ QUESTIONS 1-9: 4
SUM OF ALL RESPONSES TO PHQ9 QUESTIONS 1 & 2: 0
5. POOR APPETITE OR OVEREATING: 0
SUM OF ALL RESPONSES TO PHQ QUESTIONS 1-9: 4
2. FEELING DOWN, DEPRESSED OR HOPELESS: 0

## 2022-08-11 NOTE — PROGRESS NOTES
CONFIDENTIAL PSYCHOLOGICAL REPORT  BARIATRIC SURGERY PSYCHOLOGICAL ASSESSMENT      Patient Sussy Ruiz    Date of Interview:8/11/2022    Date of Report: 8/11/22    Patient Surgery Status:   APPROVED    Gender: female    Medical Record #:[id number]    YOB: 1970    Current Age:51 y.o. Surgeon: Ana Maria Garcia    Location of Evaluation: [facility]    Duration: 75 mins    : Massimo Gant. ANGELA Lima Sa    Interview Conducted: ___XX_ In Office  ____ Virtually(Doxy.me)  SW and pt were located in the state [place]. Consents and Disclosures  Client was identified by full name before interview began. Informed consent was discussed and obtained. Details regarding the limits of confidentiality, expectations for psychological procedures and services, provider credentials, and client rights and Fortino Black discussed with this individual. Details related to duty to warn were made explicit and client voiced understanding. Patient had capacity to provide full consent, was allowed to ask questions, expressed understanding of the information presented and voluntarily gave consent for evaluation and treatment. From a psychological perspective, this patient is considered an EXCELLENT candidate for bariatric surgery at the time of this interview. Summary of Findings:Pt is a 54 y. o.year old female, current weight of 303 pounds, who presented for psychological evaluation for gastric sleeve surgery. In review of the data and the information obtained from this assessment, there do not appear to be any absolute contraindications for successful bariatric weight loss surgery. This pt demonstrates good knowledge and understanding of the surgical procedures and processes, reasonable post surgery expectations, high motivation for weight loss s/p surgery, and a lack of significant alcohol or drug use.   The pt demonstrates a high motivation for weight loss to increase activity level, enhance energy, engage in greater activity, and reduce pain. The pt has significant health issues and chronic pain that could significantly improve after surgery. 's Concerns/Risk Factors: No significant risk factors were identified. Treatment Plan/Recommendations:  Patient and treatment team should be continuously aware of any changes in mental health status, before, during, and after surgery. Should this patient begin to experience any symptoms related to depression, anxiety, or an eating disorder, the patient should be seen for psychological assistance. The pt was encouraged to attend the Bariatric Support Group on a regular basis following surgery to increase the level of support and to maximize the chances of success. Diagnosis:    1. Psychological and behavioral factors associated with disorders or diseases classified elsewhere    2. Obesity, morbid, BMI 40.0-49.9 ([facility])        Evaluation Procedures:   [x]Interview:  patient   [x]Review of records  [x]DAST, [x]AUDIT-C, [x]PHQ, [x]SHREYA, []MDQ, [x]REALM R/SF  []EAT 26, BEDS-7   [x]Ramirez's SE, [x]OCI, [x]Medical Numbers  Results:  Medical Numbers:  No deficits noted  REALMS:   No deficits noted  OCI:    Not indicative of OCD  PHQ 9:    4    SHREYA 7:    0     DAST 10:   No drug use  MDQ:    Not administered- no risk factors  AUDIT C:   -1  EAT 26:   Not administered- no risk factors  RSE:    20  BEDS-7   Not administered- no risk factors     Weight Management History (trajectory, past weight loss attempts, environmental and physiological factors, perceived obstacles): The pt.'s primary motivation for weight loss surgery at this time is health. This could greatly improve after surgery. Losing weight would also increase her self esteem.     Pt weight issues began: [x]child  [x]teen  [x]Adult    Contributing Factors to Obesity:  [x]Genetic traits in Family of Origin  []Pregnancy  []Birth Control  [x]Medical Issues: []Thyroid []Diabetes []PCOS [x]Other  [x]Sedentary Lifestyle  []Unhealthy Food Choices  []Poor Portion Control    Contributing Factors to Unsuccessful Diet Attempts:  [x]No significant Weight Loss due to meds and limited mobility due to medical issues  []Lack of motivation  []Plateau Followed by Discouragement  []Lack of Support  []Expense    Past Diet Attempts:  [x]Weight Watchers []Keto      []Atkins   []Herbal Life  []Obesity Meds   []Dietician  []Beena Acevedo  [x]Restricted Diet/Exercise  []Shake Replacement  [x]Other- Nutri System, Isogenics, Ally, OTC fat burners    Perception of Why Bariatric Surgery will be Successful:  [x]Older/More Mature  [x]Pain Management  []Able to Self-Care   [x]Dire Health Reasons []Financial Stability  []Other  []Motivated by Family []Relationship Stability    Narrative Summary:The pt had many joint and spine issues that cause pain and limit mobility. She used to do HIIT, Pilates and strength training two times a week in order to lose weight. For thirteen years she attempted to lose weight and in the last three of these 13 years she lost 127 pounds. This was in 2007. At this time, she incurred spinal issues, degenerative disc issues,, stenosis, and herniated discs. She could no longer be active. She was unable to walk for several months until she had back surgery. Her condition improved for awhile but more degeneration occurred. She was placed on Lyrica at one time and she gained significant weight on this medication. The pt states she has struggled with her weight for most of her life. People are both sides of her family are overweight but she was served well balanced healthy meals as a child. Current Eating Habits (meal planning, portion size, frequency of meals, grazing behaviors, snacking patterns): The pt usually has a bowl of Cheerios or an egg with four cups of water for breakfast.  For a morning snack she will often have a small box or raisins.   For lunch she will usually have a salad with a protein, cheese and nuts. She will fruit or nuts for an afternoon snack. For dinner she will have meat and two veggies. She may have chips or ice cream for an evening snack a few times a week. She rarely eats out. She denies drinking coffee, soda, juice or milk. She is an avid water drinker. Eating Disorder Symptoms (binge eating with loss of control, purging, restrictive eating, night eating, sleep related eating): The pt denies vomiting or excessive exercise to rid calories, abuse of laxatives, severely restrictive dieting, or a sense of a loss of control while eating. The pt denies preoccupation with food. The pt denies a drastic amount of weight loss in a three month period. The EAT 26 screening was not completed because no risk factors were noted during this initial screening. Physical Activity (past and present): The pt states she was not active as a teen or child in activities or sports but she and her brother played and were somewhat active outside. Before 2007, the pt was able to lose over 100 pounds by exercising and portion control. Due to her physical condition,  she is not active now. She does receive PT 2 x a week and she has for two weeks. Before her medical issues became this severe she tried aerobic exercises but these became too painful. Sleep Pattern:The pt goes to bed around 11:30 and it takes her 2-3 hours to fall asleep. She sometimes wakes in the middle of the night. She rises about 5:30 am.  She has sleep apnea. A C-Pap has been ordered but it is on backorder. Medical History:    has a past medical history of Celiac disease, Endometriosis, Headache, Lyme disease, Migraines, and Vertigo. Objective Assessment:   Pt is ambulatory, drives and is independent with ADLS. The pt appears to be a reasonable candidate for bariatric weight loss surgery.   The has many unsuccessful weight loss attempts in the past and the pt has medical issues that could see significant improvement following the surgery. No risk factors that would absolutely contraindicate surgery were identified. Current Medications:  Outpatient Encounter Medications as of 8/11/2022   Medication Sig Dispense Refill    ascorbic acid (VITAMIN C) 100 MG tablet by Oral/Gastric Tube route      Calcium Carbonate-Vitamin D (OYSTER SHELL CALCIUM/D) 500-200 MG-UNIT TABS by Oral/Gastric Tube route      Magnesium Gluconate 550 MG TABS Take by mouth      potassium gluconate 550 mg tablet Take by mouth      zonisamide (ZONEGRAN) 50 MG capsule Take 50 mg by mouth in the morning and 50 mg before bedtime. B Complex Vitamins (VITAMIN B COMPLEX PO) Take 1 tablet by mouth daily      MAGNESIUM-POTASSIUM PO Take by mouth      diclofenac (VOLTAREN) 75 MG EC tablet Take 1 tablet by mouth in the morning and 1 tablet before bedtime. Do all this for 14 days. Take 1 tablet BID for 2 weeks as needed for pain. 28 tablet 0    calcium citrate (CALCITRATE) 950 (200 Ca) MG tablet Take by mouth daily      carbonyl iron (FEOSOL) 45 MG TABS Take 1 tablet by mouth daily      vitamin D3 (CHOLECALCIFEROL) 125 MCG (5000 UT) TABS tablet Take 5,000 Units by mouth daily       No facility-administered encounter medications on file as of 8/11/2022. Current Mental Health Symptoms:  [x]None  Symptoms: []sadness, []crying spells, []low motivation, []fatigue, []lack of interest,   []decreased concentration, []anxiety, panic attacks, []suicidal thoughts, []homicidal thoughts, []hallucinations, []delusions, []racing thoughts, []grandiosity, []americo,   []eating disordered symptoms, []obsessions and compulsions, []hopelessness,   []feelings of failure, []excessive worrying[]other    Mental Health History (including family history and past symptoms): The pt states she has not been treated for mental health issues. The pt states her mother sister and her brother have been treated for depression.   She and her father have never had symptoms. Orientation: Pt was oriented to person, place, time, and purpose of interview. Appearance/Personal Hygiene: Pt was appropriately dressed and neatly groomed. Eye Contact: Good    Psychosis:  Hallucinations: None  Paranoia/Delusions: None                                          Insight/Judgment: Insight and judgment are within normal limits. Intelligence: The pt.'s intelligence level appears to be within normal limits. The REALM-R, REALM-SF, and the Medical Numbers screenings were administered and no deficits were indicated. The pt is literate and has the ability to read and recognizes basic medical words. The pt can compute simple medical equations that may be needed to maintain the post surgery bariatric diet. Memory/Cognition/Executive Functioning:The pt denies memory of cognitive deficits and none were noted during this evaluation. The pt denies having past academic problems and the pt denies ever being diagnosed with a learning disability. The pt.'s executive functioning skills appear to be intact. Attention Span/Concentration:  Attention span and concentration are WNL. Fund of Knowledge:  Fund of knowledge appears to be WNL. Developmental Language Issues: No deficits reported. Mood/Affect:   []Angry  []Anxious  []Appropriate  []Bright   []Distressed  []Fatigued  []Flat   []Sad, Depressed  []Guarded  []Irritable  []Labile  [x]Even               Thought Process:   []Blocking  []Circumstantial  []Clang   [x]Coherent  []Egocentric   []Evasive  []Flight of ideas  []Incoherent  []Loose Assn   []Magical think   []Neologisms  []Perseveration  [x]Rational  []Tangential  []Word Salad           Suicidal Ideation/Intentions (present status, past history, plan, intent, past attempts): The pt denies current or past suicidal ideation. Homicidal Ideation/Intentions: The pt denies current or past homicidal ideation.     Substance Abuse (present use, past use, substances used, family history): The pt denies substance abuse issues. Her sister is addicted to pain meds due to serious medical conditions. A great uncle on her mother's side was an alcoholic. Current Living Situation (type of dwelling, length of residence, safety concerns, stability):  Persons living in the residence? The pt lives with her parents she pays them rent. [x]Rent  []Own  [x]House []Mobile Home []Apt  []Condo/Town Home  Time at Current Residence: 1.5 years  Utilities Working: Yes  Safe/Secure Environment: Yes    Relationship Status (past relationships, current status, children, relationship issues): The pt identifies as straight. She is single and she has no children. The pt denies any past serious relationships. She is ambivalent about them. She sometimes wishes she had companionship but she does not want to lose her independence. 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 Due to Covid she had to return to the [address] [place] [address]. She has been staying with her parents. She has been doing other missionary work here. Education: The pt has a Masters Degree in [employer] that she received from [employer].  History: []Yes  [x]No    Legal Issues: []Yes  [x]No    Support Systems: The pt says her support system consists of her mom, dad, co-workers and friends. She feels she has adequate support. Self Esteem/ Body Image: The pt admits to being a perfectionist and because of this she often feels that she does not measure up. She admits to some body shame. She, in general, find the human body to be \"disgusting\". Family of Origin Dynamics:The pt was born and raised in [place], [place]. She has lived many places in her life due to her [employer]  work. She was raised in a two parent household by her biological parents. The pt has a younger sister and an older brother. She is not close to her siblings.   Her younger sister is 13 years her caleb. Her siblings still live in [place]. She states her parents had a decent marriage. Her family was Yarsani. They were poor and her father worked three jobs so he was not very involved with . She says her childhood was good. She felt loved and cared for by her parents. She denies childhood abuse. Past Abuse/Trauma/Grief:The pt denies abuse as a child and as an adult and she denies trauma. Her multiple medical problems have been debilitating but she feels her medical issues are more frustrating than traumatizing. Coping Skills: The pt is Latter day and her greatest coping skill is prayer. Motivation Level: The pt is highly motivated in order to be active and involved with family. The pt wishes to increase quality of life and improve activity level. The pt hopes to be present with family for as long as possible and the possibility of living a longer and healthier life will increase with better health and weight loss. The pt is motivated to correct and/or prevent health-related issues. Knowledge and Understanding of Procedure: The pt reports that she has been well educated by the Bariatric Clinic about the nature of the procedure and the lifestyle changes that this surgery will necessitate following surgery. Surgical Expectations: The pt would eventually like to weigh roughly 158 pounds. The pt expects to have less pain, more mobility and higher stamina. Additional Comments: The pt was given handouts on Mindful Eating and this was discussed. At the mandatory follow up appointment this SW will discuss additional coping skills to deal with stress and a behavioral plan, to address lifestyle, emotional, and interpersonal issues that may occur following surgery,will be developed.     PHQ-9  8/11/2022 5/11/2021   Little interest or pleasure in doing things 0 -   Little interest or pleasure in doing things - 0   Feeling down, depressed, or hopeless 0 -   Trouble falling or staying asleep, or sleeping too much 1 -   Feeling tired or having little energy 3 -   Poor appetite or overeating 0 -   Feeling bad about yourself - or that you are a failure or have let yourself or your family down 0 -   Trouble concentrating on things, such as reading the newspaper or watching television 0 -   Moving or speaking so slowly that other people could have noticed. Or the opposite - being so fidgety or restless that you have been moving around a lot more than usual 0 -   Thoughts that you would be better off dead, or of hurting yourself in some way 0 -   PHQ-2 Score 0 -   Total Score PHQ 2 - 0   PHQ-9 Total Score 4 -   If you checked off any problems, how difficult have these problems made it for you to do your work, take care of things at home, or get along with other people? 0 -      SHREYA-7 SCREENING 8/11/2022   Feeling nervous, anxious, or on edge Not at all   Not being able to stop or control worrying Not at all   Worrying too much about different things Not at all   Trouble relaxing Not at all   Being so restless that it is hard to sit still Not at all   Becoming easily annoyed or irritable Not at all   Feeling afraid as if something awful might happen Not at all   SHREYA-7 Total Score 0        Massimo Gant.  [facility], [place]     Date:  8/11/2022

## 2022-08-12 ENCOUNTER — CLINICAL DOCUMENTATION (OUTPATIENT)
Dept: BEHAVIORAL/MENTAL HEALTH CLINIC | Facility: CLINIC | Age: 52
End: 2022-08-12

## 2022-08-12 NOTE — PROGRESS NOTES
Pro García  8/12/2022  Duration: 60 mins  Pt attended the Bariatric Support Group virtually through [employer]. This group is held at 6 pm the second Thursday of each month. Psycho-education was provided. Coping skills were discussed. The pts were encouraged to share their own thoughts and feelings. This SW facilitated the group. Guided questions were asked to encourage discussion.     Non-billable service    Total Time: 60

## 2022-08-22 ENCOUNTER — OFFICE VISIT (OUTPATIENT)
Dept: BEHAVIORAL/MENTAL HEALTH CLINIC | Facility: CLINIC | Age: 52
End: 2022-08-22
Payer: COMMERCIAL

## 2022-08-22 DIAGNOSIS — F54 PSYCHOLOGICAL AND BEHAVIORAL FACTORS ASSOCIATED WITH DISORDERS OR DISEASES CLASSIFIED ELSEWHERE: Primary | ICD-10-CM

## 2022-08-22 DIAGNOSIS — E66.01 OBESITY, MORBID, BMI 40.0-49.9 (HCC): ICD-10-CM

## 2022-08-22 PROCEDURE — 90832 PSYTX W PT 30 MINUTES: CPT | Performed by: SOCIAL WORKER

## 2022-08-22 NOTE — PROGRESS NOTES
BARIATRIC FOLLOW UP NOTE  NAME: Donita Bhatti    DATE: 8/22/2022    TYPE OF SERVICE: Individual Treatment    LOCATION OF SERVICE: [facility]/ In person visit at office    DURATION: 30 mins    DIAGNOSIS: :    1. Psychological and behavioral factors associated with disorders or diseases classified elsewhere    2. Obesity, morbid, BMI 40.0-49.9 ([facility] [address]        SURGERY STATUS: Approved    MENTAL STATUS EXAM:  Pt was appropriately groomed. Pt was 0x4. No unusual mannerisms were noted. No psychotic symptoms displayed by pt. Pt was cooperative and engaged in the session. Insight and judgment were intact. Denied suicidal or homicidal ideation. Perceptions were appropriate. Attention and concentration were normal.  No memory impairments were noted. Fund of knowledge was within normal limits. Speech pattern and language were intact. 0x4. Denied SI/HI. Mood/Affect:even with congruent affect  Thought Process: linear and coherent    Pt is progressing on goals. Pt is a 54 y.o. female who presents today for the second mandatory appointment with this SW following the initial psychological evaluation for bariatric surgery. The pt was approved for surgery. Assessment    Eating Behaviors: The pt reports eating behaviors have been going well. The pt has been eating protein and vegetables and  has been limiting carbohydrates. The pt has not been drinking with her meals. The pt  has been eating three meals a day. Any recent weight gain is denied. Straws are not being used. Pt is attempting to practice mindful eating. Exercise: The pt states she is limited due to her physical condition but she does what she can,    Carbonation: None    Caffeine: None    Logging Food:  The pt has the [employer] saleem and she logs her food off and on. Support System: Pt.'s support system is intact. She feels she has good support. Medications: Pt is compliant with her medications.     Alcohol/Drug/Nicotine Use: None    Post Surgery Behavioral Plan Completed: Yes    Clinical Narrative:Pt came in today for the 2-4 week mandatory bariatric follow up appointment. The behavioral plan was completed. Meal planning, exercise, dealing with holidays and vacations, coping skills, boundary setting and possible relationship issues were addressed. Information on urge surfing was given. Attuned movement and eating were also addressed. The ACT concept of acceptance was also discussed. Plan: Pt encouraged to attend the supportive group. Continued adherence to the bariatric regimen was reinforced. Outpatient therapy appointments were offered if needed in the future.     Follow Up: PRN

## 2022-08-23 NOTE — PROGRESS NOTES
Nic Benjamin, MS, RD, LD  Surgical Weight Loss Dietitian  [address] [facility]  [place]  Phone [phone]   Fax [phone]    Anthropometrics:    Ht: 57, wt: 292#, 184% IBW, 45.73 BMI  Pt has lost 11 lbs since last visit. Labs:   Vit D, 25-Hydroxy (ng/mL)   Date Value   06/23/2022 31.7     Folate (ng/mL)   Date Value   06/23/2022 32.2 (H)     Iron (ug/dL)   Date Value   06/23/2022 78     Ferritin (NG/ML)   Date Value   06/23/2022 112      Evaluation:  BW: 292# (-11#, visit 3 of supervised weight loss program)   Pt reports good dietary compliance over the past month. She is consuming 3 meals/d. Pt is exercising via cardio walking 2d/wk for 15-20 minutes. Diet Recall:  Breakfast: banana and 1 TBSP homemade peanut butter  Lunch: Chunk Light Tuna in water (low sodium, plain); GF coconut shrimp   Dinner: Chicken tenderloin (3.5 oz); steamed broccoli & carrots; Fairlife protein Shake  Snacks: Fairlife protein shake; Hint of Lime Tostitos (11 chips)  Drinks: water  Habits:   Eating mindfully: yes   Drinking after meals: yes, currently waiting 30-45 minutes   Elimination of sugary/carbonated/caffeinated/alcoholic beverages: yes   Drinking without straws: yes    Nutrition Diagnosis:  Morbid obesity R/T excessive energy intake as evidenced by BMI = 45.73 and 184 % IBW. Nutrition Intervention:   Diet Rx - Low-moderate calorie intake (~2100 kcal/day). Work on eating 3 meals/d and meal balance. Modify distribution, type or amount of food and nutrients within meals or at a specified time-      Encouraged elimination of sugary and carbonated beverages  Encouraged avoidance of simple carbohydrates  Encouraged lean protein focus  Encouraged practice with meal priority; protein first followed by non-starchy vegetables and complex carbohydrates  Discussed mindful eating behaviors and encouraged practice. Encouraged avoidance of fried foods.   Encouraged

## 2022-08-24 NOTE — PROGRESS NOTES
Karly Vega PA-C  Bariatric & Advanced Laparoscopic Surgery & Endoscopy  [address] [place] [address] 2050, 1632 [facility]  Arpita Cook  Phone [phone]   Fax [phone]    Date of visit: 2022      Primary/Requesting provider: SANFORD Hernandez NP         Name: Cody Garber      MRN: [id number]       : 1970       Age: 54 y.o. Sex: female        PCP: SANFORD Hernandez NP     CC:  Bariatric monthly dietary follow up    Surgeon: Dr. Jesenia Gomez    Procedure: laparoscopic sleeve gastrectomy    Surgical Consult Date: 2022    The patient is a 54 y.o. female presenting with a height of 5' 7\" (1.702 m) and weight of 292 lb (132.5 kg), giving her a Body mass index is 45.73 kg/m². She has an ideal body weight of 159 lbs, and excess body weight of 133 lbs. She started our program with a weight of 302 lbs, lost 10 lbs. She is in the process of completing all aspects of our prep program and to be deemed an acceptable candidate for bariatric surgery. Patient has a long term history of obesity with multiple failed attempts at weight reduction. Patient denies any changes in health history or physical health since last visit. Patient has been adherent to her diet and exercise regimen. Patient feels that she is well informed regarding her bariatric surgery choice and would like to proceed with a laparoscopic sleeve gastrectomy for weight reduction, improvement of her medical conditions, and improved quality of life. Patient verbalized understanding of the risks associated with bariatric surgery. Patient also verbalized understanding that bariatric surgery is a tool and that weight reduction is dependent on behavioral changes in regards to what she eats and how much.     NUTRITION ASSESSMENT:   Diet Recall:   Breakfast: banana and 1 TBSP homemade peanut butter  Lunch: Chunk Light Tuna in water (low sodium, plain); GF coconut shrimp   Dinner: Chicken tenderloin (3.5 oz); steamed broccoli & carrots; Fairlife protein Shake  Snacks: Fairlife protein shake; Hint of Lime Tostitos (11 chips)  Drinks: water  Habits:   Eating 3x/day: yes  Elimination of caffeine and carbonated beverages: yes  Practicing not drinking with meals: yes     EXERCISE ASSESSMENT: Pt is currently exercising via walking and exercises 2x per week for 30+ minutes. NIH Guidelines reviewed with pt. PSYCHOLOGICAL EVALUATION:  Completed, 08/22/2022 with Leonardo Suresh deeming her an acceptable candidate. DIETITIAN EVALUATION:  In progress with Vinay Jeff RD, LD  BARIATRIC LABS:  Completed, 06/23/2022 (folate elevated)  CARDIAC CLEARANCE:  Completed, 07/22/2022  Okay to proceed with bariatric surgery with low risk for major adverse cardiovascular outcomes for this intermediate risk type of surgery. RHEUMATOLOGY CLEARANCE:  Will eliminate, no tick-borne disease specialist accessible  UPPER GI:  Completed, 06/29/2022  1. Multiple episodes of gastroesophageal reflux. 2. Otherwise, unremarkable upper GI examination. PHYSICAL THERAPY EVALUATION FOR MOBILITY OPTIMIZATION:  Completed, 06/24/2022     MEDICAL HISTORY:  Morbid Obesity  Degenerative disc disease  Chronic pain  PVC/PAC - no meds or treatment  Migraines  Palpitations  Lymphedema  Hx of COVID-19 - Mar 2021, Feb 2022     Comorbidity Yes or No   Hypertension No   Hyperlipidemia No   Diabetes Mellitus No   Coronary Artery Disease No   Gastroesophageal Reflux  Treatment Med = Prilosec Yes   Obstructive Sleep Apnea Yes   Cancer No   Asthma No   Osteoarthritis Yes   Joint Pain Yes      Admits to GERD symptoms since she was a teenager, including heartburn and occasional regurgitation. Feels that it is mostly food related, and takes Prilosec as needed for her symptoms. She reports heartburn with specific foods. She also reports regurgitation. No previous EGD.      Other Yes or No   Venous Stasis No   Dialysis No   Long term use of steroid or immunocompromised conditions No   On Anticoagulants No      PMH:  Past Medical History:   Diagnosis Date    Celiac disease     Endometriosis     Headache     Lyme disease     Migraines     Vertigo        PSH:  Past Surgical History:   Procedure Laterality Date    BACK SURGERY      CHOLECYSTECTOMY      SALPINGO-OOPHORECTOMY      TUMOR REMOVAL         MEDS:  Current Outpatient Medications   Medication Sig Dispense Refill    predniSONE 10 MG (21) TBPK See administration instruction per 10 mg dose pack 21 each 0    ascorbic acid (VITAMIN C) 100 MG tablet by Oral/Gastric Tube route      Calcium Carbonate-Vitamin D (OYSTER SHELL CALCIUM/D) 500-200 MG-UNIT TABS by Oral/Gastric Tube route      Magnesium Gluconate 550 MG TABS Take by mouth      potassium gluconate 550 mg tablet Take by mouth      zonisamide (ZONEGRAN) 50 MG capsule Take 50 mg by mouth in the morning and 50 mg before bedtime. B Complex Vitamins (VITAMIN B COMPLEX PO) Take 1 tablet by mouth daily      MAGNESIUM-POTASSIUM PO Take by mouth      diclofenac (VOLTAREN) 75 MG EC tablet Take 1 tablet by mouth in the morning and 1 tablet before bedtime. Do all this for 14 days. Take 1 tablet BID for 2 weeks as needed for pain. 28 tablet 0    calcium citrate (CALCITRATE) 950 (200 Ca) MG tablet Take by mouth daily      carbonyl iron (FEOSOL) 45 MG TABS Take 1 tablet by mouth daily      vitamin D3 (CHOLECALCIFEROL) 125 MCG (5000 UT) TABS tablet Take 5,000 Units by mouth daily       No current facility-administered medications for this visit.        ALLERGIES:    Allergies   Allergen Reactions    Adhesive Tape Itching and Rash    Gluten Diarrhea, Dizziness or Vertigo, Palpitations and Swelling       SH:  Social History     Tobacco Use    Smoking status: Never    Smokeless tobacco: Never   Substance Use Topics    Alcohol use: Never       FH:  Family History   Problem Relation Age of Onset    Stroke Mother         TIA    Asthma Mother     Osteoarthritis Mother     Lupus Mother     Kidney Disease Mother     Seizures Mother     Headache Mother     Hypertension Mother     Heart Disease Mother     Lung Disease Mother     Hypertension Father     Diabetes Father     Hypertension Paternal Grandmother     Heart Attack Paternal Grandfather 76    Breast Cancer Neg Hx           Physical Exam:     /66   Pulse 87   Ht 5' 7\" (1.702 m)   Wt 292 lb (132.5 kg)   LMP 06/22/2022   BMI 45.73 kg/m²     General:  Well-developed, well-nourished, no distress. Psych:  Cooperative, good insight and judgement. Neuro:  Alert, oriented to person, place and time. Lungs:  Unlabored breathing. Symmetrical chest expansion. Heart:  Regular rate and rhythm. Abdomen:  Soft, obese, non-tender, non-distended. No guarding or rebound. No palpable masses. Labs: All recent labs were reviewed. Lab Results   Component Value Date    LABA1C 5.5 06/23/2022     No results found for: TSHFT4, TSH   Lab Results   Component Value Date    VITD25 31.7 06/23/2022      Lab Results   Component Value Date    PVPYDGEX72 473 06/23/2022      Lab Results   Component Value Date    IRON 78 06/23/2022    FERRITIN 112 06/23/2022        Imaging: All images were independently reviewed by me. Diagnosis Orders   1. Morbid obesity ([facility]        2. Obesity, Class III, BMI 40-49.9 (morbid obesity) ([facility]        3. Hx of gastroesophageal reflux (GERD)        4. GLO (obstructive sleep apnea)        5. PVC (premature ventricular contraction)        6. Intractable migraine without status migrainosus, unspecified migraine type        7. Lyme disease        8. Chronic bilateral low back pain without sciatica        9. H/O Spinal surgery        10. Dietary counseling        11. Exercise counseling            Assessment/Plan:    Essie Cardoso is a 54 y.o. female is here for monthly follow-up visit prior to bariatric surgery with medical co-morbidities related to morbid obesity.     Patient is a good candidate for bariatric surgery and meets NIH criteria for weight loss surgery. In detail, discussed risks and benefits of laparoscopic sleeve gastrectomy. Reviewed information and expectations regarding the pre-operative period, the bariatric procedure, and postoperative period. Stressed with patient importance of understanding bariatric surgery is a tool and will not work if patient does not continue with healthy lifestyle changes including regular exercise and high protein, low calorie, low carb diet. Patient was seen by the dietitian today for continued evaluation and management regarding lifestyle and dietary changes. Reviewed assessment and plan in detail. Patient verbalized understanding. Questions answered. Recommendations: Continue to work on dietary changes over the next couple of weeks. She may contact the office to schedule her pre-operative appointment. Alexis Aleman PA-C  Bariatric Surgery  9/2/2022    Counseling time:counseling time more than 50% of visit: 30 minutes: I spent this time preparing to see patient (including chart review and preparation), obtaining and/or reviewing additional medical history, performing a physical exam and evaluation, documenting clinical information in the electronic health record, independently interpreting results, communicating results to patient, family or caregiver, and/or coordinating care.

## 2022-09-01 ENCOUNTER — OFFICE VISIT (OUTPATIENT)
Dept: ORTHOPEDIC SURGERY | Age: 52
End: 2022-09-01
Payer: COMMERCIAL

## 2022-09-01 DIAGNOSIS — M22.2X2 PATELLOFEMORAL PAIN SYNDROME OF BOTH KNEES: ICD-10-CM

## 2022-09-01 DIAGNOSIS — M17.0 PRIMARY OSTEOARTHRITIS OF BOTH KNEES: ICD-10-CM

## 2022-09-01 DIAGNOSIS — M25.561 PAIN IN BOTH KNEES, UNSPECIFIED CHRONICITY: Primary | ICD-10-CM

## 2022-09-01 DIAGNOSIS — M22.2X1 PATELLOFEMORAL PAIN SYNDROME OF BOTH KNEES: ICD-10-CM

## 2022-09-01 DIAGNOSIS — M25.562 PAIN IN BOTH KNEES, UNSPECIFIED CHRONICITY: Primary | ICD-10-CM

## 2022-09-01 PROCEDURE — 99214 OFFICE O/P EST MOD 30 MIN: CPT | Performed by: PHYSICIAN ASSISTANT

## 2022-09-01 RX ORDER — PREDNISONE 10 MG/1
TABLET ORAL
Qty: 21 EACH | Refills: 0 | Status: ON HOLD | OUTPATIENT
Start: 2022-09-01 | End: 2022-10-11 | Stop reason: HOSPADM

## 2022-09-01 NOTE — PROGRESS NOTES
Name: Trevor Grady  YOB: 1970  Gender: female  MRN: [id number]    CC:   Chief Complaint   Patient presents with    Knee Pain     Bilateral knee recheck        HPI: Patient presents for FU of bilateral knee pain. At her last visit she was having anterior knee pain and we discussed lateral patella compression. We sent the patient for PT the patient has also been taking diclofenac which gives approximately 50% relief. The patient notes PT helped with tightness and strengthened muscle, but did not help pain. The patient notes continued medial right knee pain and lateral left knee pain. Denies numbness and tingling.      Allergies   Allergen Reactions    Adhesive Tape Itching and Rash    Gluten Diarrhea, Dizziness or Vertigo, Palpitations and Swelling     Past Medical History:   Diagnosis Date    Celiac disease     Endometriosis     Headache     Lyme disease     Migraines     Vertigo      Past Surgical History:   Procedure Laterality Date    BACK SURGERY      CHOLECYSTECTOMY      SALPINGO-OOPHORECTOMY      TUMOR REMOVAL       Family History   Problem Relation Age of Onset    Stroke Mother         TIA    Asthma Mother     Osteoarthritis Mother     Lupus Mother     Kidney Disease Mother     Seizures Mother     Headache Mother     Hypertension Mother     Heart Disease Mother     Lung Disease Mother     Hypertension Father     Diabetes Father     Hypertension Paternal Grandmother     Heart Attack Paternal Grandfather 76    Breast Cancer Neg Hx      Social History     Socioeconomic History    Marital status: Single     Spouse name: Not on file    Number of children: Not on file    Years of education: Not on file    Highest education level: Not on file   Occupational History    Not on file   Tobacco Use    Smoking status: Never    Smokeless tobacco: Never   Substance and Sexual Activity    Alcohol use: Never    Drug use: Not on file    Sexual activity: Not on file   Other Topics Concern    Not on file   Social History Narrative    Not on file     Social Determinants of Health     Financial Resource Strain: Not on file   Food Insecurity: Not on file   Transportation Needs: Not on file   Physical Activity: Not on file   Stress: Not on file   Social Connections: Not on file   Intimate Partner Violence: Not on file   Housing Stability: Not on file        No flowsheet data found. Review of Systems  Non-contributory    PE bilateral knee:    Full ROM. Negative effusion. TTP medial and lateral joint lines. TTP IT band. Moderate diffuse tenderness. Distal motor function, sensation and pulses intact. A/Plan:     ICD-10-CM    1. Pain in both knees, unspecified chronicity  M25.561     M25.562       2. Patellofemoral pain syndrome of both knees  M22.2X1     M22.2X2       3. Primary osteoarthritis of both knees  M17.0            Discussed with the patient continued symptoms of patellofemoral syndrome and also IT band tendinitis. The patient also admits to some lumbar spine pain and notes she has previously had surgical intervention. We will proceed with a dose pack to helpfully aid in all of her symptoms. We will also refer her over for evaluation of her spine. Also discusesd possible injections of the knees if patient fails to have resolution of symptoms. A short oral steroid taper was prescribed to try to bring the more acute symptoms under control. The patient understands that there are risks associated with steroids including elevated blood sugar, hypertension, increased risk of infections, and thinning of the bones if taken repeatedly or for prolonged periods. The taper can be followed by NSAIDs once completed. Return for with Spine MD-has had surgery on back before.         Yo Resendez  09/06/22

## 2022-09-02 ENCOUNTER — OFFICE VISIT (OUTPATIENT)
Dept: SURGERY | Age: 52
End: 2022-09-02
Payer: COMMERCIAL

## 2022-09-02 VITALS
HEART RATE: 87 BPM | WEIGHT: 292 LBS | SYSTOLIC BLOOD PRESSURE: 122 MMHG | DIASTOLIC BLOOD PRESSURE: 66 MMHG | HEIGHT: 67 IN | BODY MASS INDEX: 45.83 KG/M2

## 2022-09-02 DIAGNOSIS — I49.3 PVC (PREMATURE VENTRICULAR CONTRACTION): ICD-10-CM

## 2022-09-02 DIAGNOSIS — G47.33 OSA (OBSTRUCTIVE SLEEP APNEA): ICD-10-CM

## 2022-09-02 DIAGNOSIS — Z98.890 H/O SPINAL SURGERY: ICD-10-CM

## 2022-09-02 DIAGNOSIS — Z71.82 EXERCISE COUNSELING: ICD-10-CM

## 2022-09-02 DIAGNOSIS — E66.01 MORBID OBESITY (HCC): Primary | ICD-10-CM

## 2022-09-02 DIAGNOSIS — Z71.3 DIETARY COUNSELING: ICD-10-CM

## 2022-09-02 DIAGNOSIS — M54.50 CHRONIC BILATERAL LOW BACK PAIN WITHOUT SCIATICA: ICD-10-CM

## 2022-09-02 DIAGNOSIS — G89.29 CHRONIC BILATERAL LOW BACK PAIN WITHOUT SCIATICA: ICD-10-CM

## 2022-09-02 DIAGNOSIS — A69.20 LYME DISEASE: ICD-10-CM

## 2022-09-02 DIAGNOSIS — Z87.19 HX OF GASTROESOPHAGEAL REFLUX (GERD): ICD-10-CM

## 2022-09-02 DIAGNOSIS — G43.919 INTRACTABLE MIGRAINE WITHOUT STATUS MIGRAINOSUS, UNSPECIFIED MIGRAINE TYPE: ICD-10-CM

## 2022-09-02 DIAGNOSIS — E66.01 OBESITY, CLASS III, BMI 40-49.9 (MORBID OBESITY) (HCC): ICD-10-CM

## 2022-09-02 PROCEDURE — 99214 OFFICE O/P EST MOD 30 MIN: CPT | Performed by: PHYSICIAN ASSISTANT

## 2022-09-08 ENCOUNTER — OFFICE VISIT (OUTPATIENT)
Dept: ORTHOPEDIC SURGERY | Age: 52
End: 2022-09-08
Payer: COMMERCIAL

## 2022-09-08 DIAGNOSIS — M54.16 LUMBAR RADICULOPATHY: ICD-10-CM

## 2022-09-08 DIAGNOSIS — M51.36 DDD (DEGENERATIVE DISC DISEASE), LUMBAR: ICD-10-CM

## 2022-09-08 DIAGNOSIS — M54.50 LOW BACK PAIN, UNSPECIFIED BACK PAIN LATERALITY, UNSPECIFIED CHRONICITY, UNSPECIFIED WHETHER SCIATICA PRESENT: ICD-10-CM

## 2022-09-08 DIAGNOSIS — M51.16 LUMBAR DISC HERNIATION WITH RADICULOPATHY: ICD-10-CM

## 2022-09-08 DIAGNOSIS — M51.24 THORACIC DISC HERNIATION: Primary | ICD-10-CM

## 2022-09-08 PROCEDURE — 99204 OFFICE O/P NEW MOD 45 MIN: CPT | Performed by: PHYSICIAN ASSISTANT

## 2022-09-08 NOTE — LETTER
Katie ROSALEST  1970  MRN [id number]                                              ROOM NUMBER______      Radiographic Studies:    Cervical MRI      Thoracic MRI         Lumbar MRI          Pelvis MRI        CONTRAST    CT Myelogram: _______________   NCS/EMG ________________ ( UE  /  LE )     MRI of ___________________          Other: ____________________      Injections:    KNEE    HIP  Depomedrol _____ mg Euflexxa _____    _______________ TFESI/SNRB  _______________ SI Joint  _______________ MARISSA    _______________ Facet  _______________Piriformis/ Sciatica      Medications:    Oral Steroids _______________  NSAIDS _______________    Muscle Relaxers _______________  Neurontin/Lyrica _______________    Pain Medicine _______________  Other _______________                       Physical Therapy:    Lumbar     Thoracic      Cervical     Hip       Knee       Shoulder               Traction          Ultrasound          Dry Needling      Referral:    Pain referral:  [facility]   [facility]   Other: ______________________________    Follow-up/ Refer__________________________________________________    Authorization to hold blood thinners:___________________________________

## 2022-09-08 NOTE — PROGRESS NOTES
Name: Jaylon Leo  YOB: 1970  Gender: female  MRN: [id number]    CC: Back Pain (Upper, mid, and low back pain with bilateral leg pain and numbness)       HPI: This is a 54y.o. year old female who is referred to me by Kathie English, physician assistant. This is a patient who presented with bilateral knee pain. 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 Patient does have history of lumbar surgery in 2018 in which she had a large disc herniation at L5S1. She does report she had relief of symptoms after that surgery however sometime around a year ago she began having back pain again bilateral hip and leg pain but more prominent radicular symptoms in the leg. Over the course of the years she has had increased weakness of the right lower extremity. She had a myelogram CT scan May 2021 that was ordered by Dr. Yudith Tadeo. Per Dr. Fran Cox note the scan did show some encasement around the right S1 nerve root likely due to epidural fibrosis. Ports she was referred for a right selective nerve root block at S1. She went about Dr. Domenica Valdivia for that by Dr. Domenica Valdivia would not do the shot because of the fibrosis. She tried Lyrica without relief. She also reports weakness in the right leg with leg with hip flexion. She has pain that radiates up the back. She denies pain in her arms and no numbness or tingling in the arms. No weakness of the upper extremities. Other treatment has included 6 weeks of physical therapy, oral steroids. This patient  has had lumbar surgery in the past.        AMB PAIN ASSESSMENT 9/8/2022   Location of Pain Back   Location Modifiers Right;Left   Severity of Pain 6   Quality of Pain Aching; Sharp   Duration of Pain Persistent   Frequency of Pain Constant   Date Pain First Started 2/1/2018   Aggravating Factors Walking;Standing;Stairs   Limiting Behavior Yes   Relieving Factors Nsaids; Other (Comment)   Result of Injury No   Work-Related Injury No   Are there other pain locations you wish to document? No              ROS/Meds/PSH/PMH/FH/SH: I personally reviewed the patient's collected intake data. Below are the pertinents:    Allergies   Allergen Reactions    Adhesive Tape Itching and Rash    Gluten Diarrhea, Dizziness or Vertigo, Palpitations and Swelling         Current Outpatient Medications:     predniSONE 10 MG (21) TBPK, See administration instruction per 10 mg dose pack, Disp: 21 each, Rfl: 0    ascorbic acid (VITAMIN C) 100 MG tablet, by Oral/Gastric Tube route, Disp: , Rfl:     Calcium Carbonate-Vitamin D (OYSTER SHELL CALCIUM/D) 500-200 MG-UNIT TABS, by Oral/Gastric Tube route, Disp: , Rfl:     Magnesium Gluconate 550 MG TABS, Take by mouth, Disp: , Rfl:     potassium gluconate 550 mg tablet, Take by mouth, Disp: , Rfl:     zonisamide (ZONEGRAN) 50 MG capsule, Take 50 mg by mouth in the morning and 50 mg before bedtime. , Disp: , Rfl:     B Complex Vitamins (VITAMIN B COMPLEX PO), Take 1 tablet by mouth daily, Disp: , Rfl:     MAGNESIUM-POTASSIUM PO, Take by mouth, Disp: , Rfl:     diclofenac (VOLTAREN) 75 MG EC tablet, Take 1 tablet by mouth in the morning and 1 tablet before bedtime. Do all this for 14 days.  Take 1 tablet BID for 2 weeks as needed for pain., Disp: 28 tablet, Rfl: 0    calcium citrate (CALCITRATE) 950 (200 Ca) MG tablet, Take by mouth daily, Disp: , Rfl:     carbonyl iron (FEOSOL) 45 MG TABS, Take 1 tablet by mouth daily, Disp: , Rfl:     vitamin D3 (CHOLECALCIFEROL) 125 MCG (5000 UT) TABS tablet, Take 5,000 Units by mouth daily, Disp: , Rfl:     Past Surgical History:   Procedure Laterality Date    BACK SURGERY      CHOLECYSTECTOMY      SALPINGO-OOPHORECTOMY      TUMOR REMOVAL         Patient Active Problem List   Diagnosis    DDD (degenerative disc disease), lumbar    Lumbar radiculopathy    Morbid obesity ([facility]    Sleep apnea    Failed back surgical syndrome    History of Lyme disease This would better be imaged with an MRI scan. There is also T12-L1 central to leftward disc protrusion that just abuts the conus. Narrative   Title:  Lumbar CT myelogram.       Indication:  Dx: Lumbar radiculopathy [M54.16 (ICD-10-CM)]; DDD (degenerative   disc disease), lumbar [M51.36 (ICD-10-CM)]; Morbid obesity ([facility] [E66.01   (ICD-10-CM)]       Technique:  Axial images through the lumbar spine were obtained after the   intrathecal administration of contrast media. Intrathecal contrast was used to   best identify abnormal thecal sac impression. Images were reformatted in the   coronal and axial planes. All CT scans at this facility are performed using dose reduction/dose modulation   techniques, as appropriate the performed exam, including the following:    Automated Exposure Control; Adjustment of the mA and/or kV according to patient   size (this includes techniques or standardized protocols for targeted exams   where dose is matched to indication/reason for exam); and Use of Iterative   Reconstruction Technique. Comparison:  None. Findings:  Visualized portions of the abdomen and pelvis are within normal   limits. .        T12-L1:  Central disc herniation with mild spinal canal stenosis. No significant   neural foraminal stenosis. L1-L2:  No spinal canal or neural foraminal stenosis. L2-L3:  No spinal canal or neural foraminal stenosis. L3-L4:  Mild facet hypertrophy. No spinal canal or neural foraminal stenosis. L4-L5:  Mild facet hypertrophy. Left eccentric broad-based disc bulge with mild   narrowing of the left neural foramina. No significant right neuroforaminal   narrowing. No spinal canal stenosis. L5-S1: Moderate facet hypertrophy. Postsurgical changes of prior discectomy at   L5/S1.  There is obliteration of the CSF space surrounding the right L5 nerve   root with associated surrounding soft tissue density which may represent   postsurgical fibrosis or recurrent disc herniation. There appears to be at least   moderate right L5/S1 neural foraminal stenosis. Impression   1. Postsurgical changes of prior discectomy at L5/S1. There is obliteration of   the CSF space surrounding the right L5 nerve root with associated surrounding   soft tissue density which may represent postsurgical fibrosis or recurrent disc   herniation. There appears to be at least moderate right L5/S1 neural foraminal   stenosis. 2.  Central disc herniation at T12/L1 mild spinal canal stenosis. Assessment/Plan:       Diagnosis Orders   1. Thoracic disc herniation        2. Low back pain, unspecified back pain laterality, unspecified chronicity, unspecified whether sciatica present  XR LUMBAR SPINE (2-3 VIEWS)    MRI LUMBAR SPINE WO CONTRAST      3. Lumbar radiculopathy  MRI LUMBAR SPINE WO CONTRAST      4. DDD (degenerative disc disease), lumbar        5. Lumbar disc herniation with radiculopathy          I discussed with the patient we do see some obliteration around the L5 and S1 right-sided nerve roots from the prior milligrams CT scan. The question is if this is scar tissue she would probably not benefit from further surgery however if this is a recurrent disc herniation we would better be able to visualize this with an MRI scan. There is also the T12-L1 disc protrusion that if this has progressed may be the source of her right hip flexor weakness. I do recommend that we get a new MRI of the lumbar spine visualizing of the T12.    - A MRI was ordered to delineate anatomy, confirm the diagnosis and assess the severity. 4 This is a chronic illness/condition with exacerbation and progression    No orders of the defined types were placed in this encounter. Orders Placed This Encounter   Procedures    XR LUMBAR SPINE (2-3 VIEWS)    MRI LUMBAR SPINE WO CONTRAST              No follow-ups on file.      Samir Cedillo PA-C  09/08/22      Elements of this note were created using speech recognition software. As such, errors of speech recognition may be present.

## 2022-09-09 NOTE — PROGRESS NOTES
Lisa Smith MD   Bariatric & Advanced Laparoscopic Surgery & Endoscopy  [address] [place] [address]  Arpita Cook  Phone [phone]   Fax [phone]      Date of visit: 2022      Primary/Requesting provider: SANFORD Turcios NP         Name: Juancarlos Reeves      MRN: [id number]       : 1970       Age: 54 y.o. Sex: female        PCP: SANFORD Turcios NP     CC:    Chief Complaint   Patient presents with    Follow-up     BAR - Pre-op Sleeve        Procedure: laparoscopic sleeve gastrectomy    Surgical Consult Date: 2022    Surgical Date: TBD     The patient is a 54 y.o. female presenting with a height of 5' 7\" (1.702 m) and weight of 294 lb (133.4 kg), giving her a Body mass index is 46.05 kg/m². She has an ideal body weight of 159 lbs, and excess body weight of 135 lbs. She started our program with a weight of 302 lbs, losing 8 lbs. She has completed all aspects of our prep program and has been deemed an acceptable candidate for bariatric surgery. 30-Day Bariatric Surgical Risk Percentage: 5.36%    PSYCHOLOGICAL EVALUATION:   Completed, 2022 with Antonio Diana deeming her an acceptable candidate. DIETITIAN EVALUATION:  Completed with Duane Ramirezo deeming her an appropriate surgical candidate. BARIATRIC LABS:  Completed, 2022 (folate elevated)    CARDIAC CLEARANCE:  Completed, 2022  Okay to proceed with bariatric surgery with low risk for major adverse cardiovascular outcomes for this intermediate risk type of surgery. RHEUMATOLOGY CLEARANCE:  Will eliminate, no tick-borne disease specialist accessible    UPPER GI:  Completed, 2022  1. Multiple episodes of gastroesophageal reflux. 2. Otherwise, unremarkable upper GI examination.      PHYSICAL THERAPY EVALUATION FOR MOBILITY OPTIMIZATION:  Completed, 2022    We have reviewed the procedure again today and completed our standard pre op teaching. Her questions were answered and she is ready to schedule surgery. We have discussed the procedure, diet and exercise regimens, and potential complications of surgery. The patient understands the nature and potential complication of surgery and has the capacity to follow the post operative diet, exercise, and nutritional requirements. After review, she has signed her surgical consent today. MEDICAL HISTORY:  Morbid Obesity  Degenerative disc disease  Chronic pain  PVC/PAC - no meds or treatment  Migraines  Palpitations  Lymphedema  Hx of COVID-19 - Mar 2021, Feb 2022     Comorbidity Yes or No   Hypertension No   Hyperlipidemia No   Diabetes Mellitus No   Coronary Artery Disease No   Gastroesophageal Reflux  Treatment Med = Prilosec Yes   Obstructive Sleep Apnea Yes   Cancer No   Asthma No   Osteoarthritis Yes   Joint Pain Yes      Admits to GERD symptoms since she was a teenager, including heartburn and occasional regurgitation. Feels that it is mostly food related, and takes Prilosec as needed for her symptoms. She reports heartburn with specific foods. She also reports regurgitation. No previous EGD. Other Yes or No   Venous Stasis No   Dialysis No   Long term use of steroid or immunocompromised conditions No   On Anticoagulants No         DENTAL/CHEWING ABILITY:  No dental issues with chewing. PRIOR WEIGHT LOSS ATTEMPTS:  Reports 4-10 previous weight loss attempts including Weight Watchers, Nutrisystem, Isagenix, and diet and exercise. EXERCISE ASSESSMENT:  No current physical activity. Reviewed NIH guidelines. PSYCHOSOCIAL:  She notes she is single and states main support person is her mother, Aguilar Voss. 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 Her goal in pursuing surgical weight loss is to improve health. She denies any active psychological problems and reports appropriate treatment of depression.   She states she is independent in her care, can drive a car, and can ambulate with the use of a cane. Patient has a long term history of obesity with multiple failed attempts at weight reduction. Patient denies any changes in health history or physical health since last visit. Patient has been adherent to her diet and exercise regimen. Patient feels that she is well informed regarding her bariatric surgery choice and would like to proceed with a laparoscopic sleeve gastrectomy for weight reduction, improvement of her medical conditions, and improved quality of life. Patient verbalized understanding of the risks associated with bariatric surgery. Patient also verbalized understanding that bariatric surgery is a tool and that weight reduction is dependent on behavioral changes in regards to what she eats and how much. PMH:    Past Medical History:   Diagnosis Date    Celiac disease     Endometriosis     Headache     Lyme disease     Migraines     Vertigo        PSH:    Past Surgical History:   Procedure Laterality Date    BACK SURGERY      CHOLECYSTECTOMY      SALPINGO-OOPHORECTOMY      TUMOR REMOVAL         MEDS:    Current Outpatient Medications   Medication Sig Dispense Refill    sucralfate (CARAFATE) 1 GM tablet Take 1 tablet by mouth 4 times daily for 14 days Dissolve in water and drink. 56 tablet 0    omeprazole (PRILOSEC) 40 MG delayed release capsule Take 1 capsule by mouth every morning (before breakfast) 90 capsule 0    oxyCODONE-acetaminophen (PERCOCET) 5-325 MG per tablet Take 1 tablet by mouth every 6 hours as needed for Pain for up to 5 days. Intended supply: 5 days.  Take lowest dose possible to manage pain 20 tablet 0    ondansetron (ZOFRAN) 4 MG tablet Take 1 tablet by mouth 3 times daily as needed for Nausea or Vomiting 30 tablet 0    predniSONE 10 MG (21) TBPK See administration instruction per 10 mg dose pack 21 each 0    ascorbic acid (VITAMIN C) 100 MG tablet by Oral/Gastric Tube route      Calcium Carbonate-Vitamin D (OYSTER SHELL CALCIUM/D) 500-200 MG-UNIT TABS by Oral/Gastric Tube route      Magnesium Gluconate 550 MG TABS Take by mouth      potassium gluconate 550 mg tablet Take by mouth      zonisamide (ZONEGRAN) 50 MG capsule Take 50 mg by mouth in the morning and 50 mg before bedtime. B Complex Vitamins (VITAMIN B COMPLEX PO) Take 1 tablet by mouth daily      MAGNESIUM-POTASSIUM PO Take by mouth      diclofenac (VOLTAREN) 75 MG EC tablet Take 1 tablet by mouth in the morning and 1 tablet before bedtime. Do all this for 14 days. Take 1 tablet BID for 2 weeks as needed for pain. 28 tablet 0    calcium citrate (CALCITRATE) 950 (200 Ca) MG tablet Take by mouth daily      carbonyl iron (FEOSOL) 45 MG TABS Take 1 tablet by mouth daily      vitamin D3 (CHOLECALCIFEROL) 125 MCG (5000 UT) TABS tablet Take 5,000 Units by mouth daily       No current facility-administered medications for this visit. ALLERGIES:      Allergies   Allergen Reactions    Adhesive Tape Itching and Rash    Gluten Diarrhea, Dizziness or Vertigo, Palpitations and Swelling       SH:    Social History     Tobacco Use    Smoking status: Never    Smokeless tobacco: Never   Substance Use Topics    Alcohol use: Never       FH:    Family History   Problem Relation Age of Onset    Stroke Mother         TIA    Asthma Mother     Osteoarthritis Mother     Lupus Mother     Kidney Disease Mother     Seizures Mother     Headache Mother     Hypertension Mother     Heart Disease Mother     Lung Disease Mother     Hypertension Father     Diabetes Father     Hypertension Paternal Grandmother     Heart Attack Paternal Grandfather 76    Breast Cancer Neg Hx           Physical Exam:     /65   Pulse 69   Ht 5' 7\" (1.702 m)   Wt 294 lb (133.4 kg)   LMP 06/22/2022   BMI 46.05 kg/m²     General:  Well-developed, well-nourished, no distress. Psych:  Cooperative, good insight and judgement. Neuro:  Alert, oriented to person, place and time.   HEENT: Normocephalic, atraumatic. Sclera clear. Lungs:  Unlabored breathing. Symmetrical chest expansion. Chest wall:  No tenderness or deformity. Heart:  Regular rate and rhythm. No JVD. Abdomen:  Soft, obese, non-tender, non-distended. No guarding or rebound. No palpable masses. Extremities:  Extremities normal, atraumatic, no cyanosis or edema. Skin:  Skin color, texture, turgor normal. No rashes. Labs: All recent labs were reviewed. Imaging: All images were independently reviewed by me. Diagnosis Orders   1. Morbid obesity ([facility] [address]        2. Obesity, Class III, BMI 40-49.9 (morbid obesity) ([facility] [address]        3. Hx of gastroesophageal reflux (GERD)  sucralfate (CARAFATE) 1 GM tablet    omeprazole (PRILOSEC) 40 MG delayed release capsule      4. GLO (obstructive sleep apnea)        5. PVC (premature ventricular contraction)        6. Intractable migraine without status migrainosus, unspecified migraine type        7. Lyme disease        8. Chronic bilateral low back pain without sciatica        9. Post-operative pain  oxyCODONE-acetaminophen (PERCOCET) 5-325 MG per tablet      10. Post-operative nausea and vomiting  ondansetron (ZOFRAN) 4 MG tablet          Assessment/Plan:  Jaylon Leo is a 54 y.o. female is here her preoperative visit prior to bariatric surgery. 1. Patient is an excellent candidate for bariatric surgery and meets NIH criteria for weight loss surgery. Patient has clear medical necessity for bariatric surgery. Patient is well informed, motivated, and has a strong desire for weight loss. 2. In detail, discussed risks and benefits of sleeve gastrectomy. The gastric sleeve procedure is performed utilizing a stapler to remove a portion of the stomach, creating a smaller, banana-shaped tube.  We discussed specific risks of sleeve gastrectomy including but not limited to: pain, infection, bleeding, scar, excess skin, conversion to open approach, hernia, injury to adjacent organs, need for blood transfusion, thromboembolic complications, gastrointestinal leak, stricture, difficulty swallowing, need for revisional surgery, gastroesophageal reflux, esophagitis or esophageal cancer, need for revisional surgery, failure to lose weight or weight regain, nutritional deficiencies, heart attack, stroke, death. 3. Discussed in detail information and expectations regarding the pre-operative period, the bariatric procedure, and postoperative period. 4. The patient has reviewed, completed, and signed consent that she has viewed all pre operative teaching videos. 5. Stop NSAIDS 7 days prior to surgery date. 6.  May continue Aspirin 81 mg po until day before surgery. 7.  RX for Prilosec, Zofran, Percocet, and Carafate. 8.  Reviewed post op follow up appointment schedule. Patient referred to [facility] to schedule post op follow up visits 1.5 weeks, 3 weeks, 3 months, 6 months, and 12 months. 5. Stressed with patient importance of understanding bariatric surgery is a tool and will not work if patient does not continue with healthy lifestyle changes including regular exercise and high protein, low calorie, low carb diet. 10. The dietitian reviewed pre-op diet including high protein diet, sugar/calorie free liquids for 2 weeks prior. Liquids only the day before surgery. Full Liquid Diet - from day of discharge from hospital until first [facility] visit, usually 1.5 weeks. 11. The dietitian reviewed 2 weeks post op diet full liquids. 12.  Discussed anticipated recovery time off from work. Explained to patient this may vary depending on the patient's overall health and personal recovery. Catalina-en-y Gastric Bypass 2-4 weeks; Gastric Sleeve 1-2 weeks. 13.  Reviewed assessment and plan in detail. Patient verbalized understanding. Questions answered. 14. Based on physical assessments and evaluations, this patient is medically cleared for surgery. WEIGHT MANAGEMENT:  1.  Counseled on weight management and weight loss. 2. Instructed on choosing better foods with alternatives. 3. Advised on low carbohydrate, high protein diet (at least 60 to 80 gm protein daily). 4.  Positive reinforcement provided. Time: I spent 40 minutes preparing to see patient (including chart review and preparation), obtaining and/or reviewing additional medical history, performing a physical exam and evaluation, documenting clinical information in the electronic health record, independently interpreting results, communicating results to patient, family or caregiver, and/or coordinating care.      Signed: Andrez Aleman MD  Bariatric & Minimally Invasive Surgery  9/13/2022

## 2022-09-09 NOTE — H&P (VIEW-ONLY)
Manny Herrera MD   Bariatric & Advanced Laparoscopic Surgery & Endoscopy  [address] [place] [address] 2050, [address] Arpita Proctor  Phone [phone]   Fax [phone]      Date of visit: 2022      Primary/Requesting provider: SANFORD Tracy NP         Name: Katie Fernando      MRN: [id number]       : 1970       Age: 54 y.o. Sex: female        PCP: SANFORD Tracy NP     CC:    Chief Complaint   Patient presents with    Follow-up     BAR - Pre-op Sleeve        Procedure: laparoscopic sleeve gastrectomy    Surgical Consult Date: 2022    Surgical Date: TBD     The patient is a 54 y.o. female presenting with a height of 5' 7\" (1.702 m) and weight of 294 lb (133.4 kg), giving her a Body mass index is 46.05 kg/m². She has an ideal body weight of 159 lbs, and excess body weight of 135 lbs. She started our program with a weight of 302 lbs, losing 8 lbs. She has completed all aspects of our prep program and has been deemed an acceptable candidate for bariatric surgery. 30-Day Bariatric Surgical Risk Percentage: 5.36%    PSYCHOLOGICAL EVALUATION:   Completed, 2022 with Jenna Chi deeming her an acceptable candidate. DIETITIAN EVALUATION:  Completed with Nabeel Has deeming her an appropriate surgical candidate. BARIATRIC LABS:  Completed, 2022 (folate elevated)    CARDIAC CLEARANCE:  Completed, 2022  Okay to proceed with bariatric surgery with low risk for major adverse cardiovascular outcomes for this intermediate risk type of surgery. RHEUMATOLOGY CLEARANCE:  Will eliminate, no tick-borne disease specialist accessible    UPPER GI:  Completed, 2022  1. Multiple episodes of gastroesophageal reflux. 2. Otherwise, unremarkable upper GI examination.      PHYSICAL THERAPY EVALUATION FOR MOBILITY OPTIMIZATION:  Completed, 2022    We have reviewed the procedure again today and completed our standard pre care, can drive a car, and can ambulate with the use of a cane. Patient has a long term history of obesity with multiple failed attempts at weight reduction. Patient denies any changes in health history or physical health since last visit. Patient has been adherent to her diet and exercise regimen. Patient feels that she is well informed regarding her bariatric surgery choice and would like to proceed with a laparoscopic sleeve gastrectomy for weight reduction, improvement of her medical conditions, and improved quality of life. Patient verbalized understanding of the risks associated with bariatric surgery. Patient also verbalized understanding that bariatric surgery is a tool and that weight reduction is dependent on behavioral changes in regards to what she eats and how much. PMH:    Past Medical History:   Diagnosis Date    Celiac disease     Endometriosis     Headache     Lyme disease     Migraines     Vertigo        PSH:    Past Surgical History:   Procedure Laterality Date    BACK SURGERY      CHOLECYSTECTOMY      SALPINGO-OOPHORECTOMY      TUMOR REMOVAL         MEDS:    Current Outpatient Medications   Medication Sig Dispense Refill    sucralfate (CARAFATE) 1 GM tablet Take 1 tablet by mouth 4 times daily for 14 days Dissolve in water and drink. 56 tablet 0    omeprazole (PRILOSEC) 40 MG delayed release capsule Take 1 capsule by mouth every morning (before breakfast) 90 capsule 0    oxyCODONE-acetaminophen (PERCOCET) 5-325 MG per tablet Take 1 tablet by mouth every 6 hours as needed for Pain for up to 5 days. Intended supply: 5 days.  Take lowest dose possible to manage pain 20 tablet 0    ondansetron (ZOFRAN) 4 MG tablet Take 1 tablet by mouth 3 times daily as needed for Nausea or Vomiting 30 tablet 0    predniSONE 10 MG (21) TBPK See administration instruction per 10 mg dose pack 21 each 0    ascorbic acid (VITAMIN C) 100 MG tablet by Oral/Gastric Tube route      Calcium Carbonate-Vitamin D (OYSTER SHELL CALCIUM/D) 500-200 MG-UNIT TABS by Oral/Gastric Tube route      Magnesium Gluconate 550 MG TABS Take by mouth      potassium gluconate 550 mg tablet Take by mouth      zonisamide (ZONEGRAN) 50 MG capsule Take 50 mg by mouth in the morning and 50 mg before bedtime. B Complex Vitamins (VITAMIN B COMPLEX PO) Take 1 tablet by mouth daily      MAGNESIUM-POTASSIUM PO Take by mouth      diclofenac (VOLTAREN) 75 MG EC tablet Take 1 tablet by mouth in the morning and 1 tablet before bedtime. Do all this for 14 days. Take 1 tablet BID for 2 weeks as needed for pain. 28 tablet 0    calcium citrate (CALCITRATE) 950 (200 Ca) MG tablet Take by mouth daily      carbonyl iron (FEOSOL) 45 MG TABS Take 1 tablet by mouth daily      vitamin D3 (CHOLECALCIFEROL) 125 MCG (5000 UT) TABS tablet Take 5,000 Units by mouth daily       No current facility-administered medications for this visit. ALLERGIES:      Allergies   Allergen Reactions    Adhesive Tape Itching and Rash    Gluten Diarrhea, Dizziness or Vertigo, Palpitations and Swelling       SH:    Social History     Tobacco Use    Smoking status: Never    Smokeless tobacco: Never   Substance Use Topics    Alcohol use: Never       FH:    Family History   Problem Relation Age of Onset    Stroke Mother         TIA    Asthma Mother     Osteoarthritis Mother     Lupus Mother     Kidney Disease Mother     Seizures Mother     Headache Mother     Hypertension Mother     Heart Disease Mother     Lung Disease Mother     Hypertension Father     Diabetes Father     Hypertension Paternal Grandmother     Heart Attack Paternal Grandfather 76    Breast Cancer Neg Hx           Physical Exam:     /65   Pulse 69   Ht 5' 7\" (1.702 m)   Wt 294 lb (133.4 kg)   LMP 06/22/2022   BMI 46.05 kg/m²     General:  Well-developed, well-nourished, no distress. Psych:  Cooperative, good insight and judgement. Neuro:  Alert, oriented to person, place and time.   HEENT: Normocephalic, atraumatic. Sclera clear. Lungs:  Unlabored breathing. Symmetrical chest expansion. Chest wall:  No tenderness or deformity. Heart:  Regular rate and rhythm. No JVD. Abdomen:  Soft, obese, non-tender, non-distended. No guarding or rebound. No palpable masses. Extremities:  Extremities normal, atraumatic, no cyanosis or edema. Skin:  Skin color, texture, turgor normal. No rashes. Labs: All recent labs were reviewed. Imaging: All images were independently reviewed by me. Diagnosis Orders   1. Morbid obesity ([facility] [address]        2. Obesity, Class III, BMI 40-49.9 (morbid obesity) ([facility] [address]        3. Hx of gastroesophageal reflux (GERD)  sucralfate (CARAFATE) 1 GM tablet    omeprazole (PRILOSEC) 40 MG delayed release capsule      4. GLO (obstructive sleep apnea)        5. PVC (premature ventricular contraction)        6. Intractable migraine without status migrainosus, unspecified migraine type        7. Lyme disease        8. Chronic bilateral low back pain without sciatica        9. Post-operative pain  oxyCODONE-acetaminophen (PERCOCET) 5-325 MG per tablet      10. Post-operative nausea and vomiting  ondansetron (ZOFRAN) 4 MG tablet          Assessment/Plan:  Barbie Aguilar is a 54 y.o. female is here her preoperative visit prior to bariatric surgery. 1. Patient is an excellent candidate for bariatric surgery and meets NIH criteria for weight loss surgery. Patient has clear medical necessity for bariatric surgery. Patient is well informed, motivated, and has a strong desire for weight loss. 2. In detail, discussed risks and benefits of sleeve gastrectomy. The gastric sleeve procedure is performed utilizing a stapler to remove a portion of the stomach, creating a smaller, banana-shaped tube.  We discussed specific risks of sleeve gastrectomy including but not limited to: pain, infection, bleeding, scar, excess skin, conversion to open approach, hernia, injury to adjacent organs, need for blood transfusion, thromboembolic complications, gastrointestinal leak, stricture, difficulty swallowing, need for revisional surgery, gastroesophageal reflux, esophagitis or esophageal cancer, need for revisional surgery, failure to lose weight or weight regain, nutritional deficiencies, heart attack, stroke, death. 3. Discussed in detail information and expectations regarding the pre-operative period, the bariatric procedure, and postoperative period. 4. The patient has reviewed, completed, and signed consent that she has viewed all pre operative teaching videos. 5. Stop NSAIDS 7 days prior to surgery date. 6.  May continue Aspirin 81 mg po until day before surgery. 7.  RX for Prilosec, Zofran, Percocet, and Carafate. 8.  Reviewed post op follow up appointment schedule. Patient referred to [facility] to schedule post op follow up visits 1.5 weeks, 3 weeks, 3 months, 6 months, and 12 months. 5. Stressed with patient importance of understanding bariatric surgery is a tool and will not work if patient does not continue with healthy lifestyle changes including regular exercise and high protein, low calorie, low carb diet. 10. The dietitian reviewed pre-op diet including high protein diet, sugar/calorie free liquids for 2 weeks prior. Liquids only the day before surgery. Full Liquid Diet - from day of discharge from hospital until first [facility] visit, usually 1.5 weeks. 11. The dietitian reviewed 2 weeks post op diet full liquids. 12.  Discussed anticipated recovery time off from work. Explained to patient this may vary depending on the patient's overall health and personal recovery. Catalina-en-y Gastric Bypass 2-4 weeks; Gastric Sleeve 1-2 weeks. 13.  Reviewed assessment and plan in detail. Patient verbalized understanding. Questions answered. 14. Based on physical assessments and evaluations, this patient is medically cleared for surgery. WEIGHT MANAGEMENT:  1.  Counseled on weight management and weight loss. 2. Instructed on choosing better foods with alternatives. 3. Advised on low carbohydrate, high protein diet (at least 60 to 80 gm protein daily). 4.  Positive reinforcement provided. Time: I spent 40 minutes preparing to see patient (including chart review and preparation), obtaining and/or reviewing additional medical history, performing a physical exam and evaluation, documenting clinical information in the electronic health record, independently interpreting results, communicating results to patient, family or caregiver, and/or coordinating care.      Signed: Baljit Beard MD  Bariatric & Minimally Invasive Surgery  9/13/2022

## 2022-09-13 ENCOUNTER — OFFICE VISIT (OUTPATIENT)
Dept: SURGERY | Age: 52
End: 2022-09-13
Payer: COMMERCIAL

## 2022-09-13 ENCOUNTER — PREP FOR PROCEDURE (OUTPATIENT)
Dept: SURGERY | Age: 52
End: 2022-09-13

## 2022-09-13 VITALS
HEIGHT: 67 IN | HEART RATE: 69 BPM | SYSTOLIC BLOOD PRESSURE: 114 MMHG | BODY MASS INDEX: 45.99 KG/M2 | DIASTOLIC BLOOD PRESSURE: 65 MMHG | WEIGHT: 293 LBS

## 2022-09-13 DIAGNOSIS — M54.50 CHRONIC BILATERAL LOW BACK PAIN WITHOUT SCIATICA: ICD-10-CM

## 2022-09-13 DIAGNOSIS — E66.01 OBESITY, CLASS III, BMI 40-49.9 (MORBID OBESITY) (HCC): ICD-10-CM

## 2022-09-13 DIAGNOSIS — G89.29 CHRONIC BILATERAL LOW BACK PAIN WITHOUT SCIATICA: ICD-10-CM

## 2022-09-13 DIAGNOSIS — G89.18 POST-OPERATIVE PAIN: ICD-10-CM

## 2022-09-13 DIAGNOSIS — G43.919 INTRACTABLE MIGRAINE WITHOUT STATUS MIGRAINOSUS, UNSPECIFIED MIGRAINE TYPE: ICD-10-CM

## 2022-09-13 DIAGNOSIS — E66.01 MORBID OBESITY (HCC): Primary | ICD-10-CM

## 2022-09-13 DIAGNOSIS — R11.2 POST-OPERATIVE NAUSEA AND VOMITING: ICD-10-CM

## 2022-09-13 DIAGNOSIS — A69.20 LYME DISEASE: ICD-10-CM

## 2022-09-13 DIAGNOSIS — I49.3 PVC (PREMATURE VENTRICULAR CONTRACTION): ICD-10-CM

## 2022-09-13 DIAGNOSIS — Z87.19 HX OF GASTROESOPHAGEAL REFLUX (GERD): ICD-10-CM

## 2022-09-13 DIAGNOSIS — Z98.890 POST-OPERATIVE NAUSEA AND VOMITING: ICD-10-CM

## 2022-09-13 DIAGNOSIS — G47.33 OSA (OBSTRUCTIVE SLEEP APNEA): ICD-10-CM

## 2022-09-13 PROCEDURE — APPSS45 APP SPLIT SHARED TIME 31-45 MINUTES: Performed by: PHYSICIAN ASSISTANT

## 2022-09-13 PROCEDURE — 99215 OFFICE O/P EST HI 40 MIN: CPT | Performed by: SURGERY

## 2022-09-13 RX ORDER — OXYCODONE HYDROCHLORIDE AND ACETAMINOPHEN 5; 325 MG/1; MG/1
1 TABLET ORAL EVERY 6 HOURS PRN
Qty: 20 TABLET | Refills: 0 | Status: SHIPPED | OUTPATIENT
Start: 2022-09-13 | End: 2022-09-18

## 2022-09-13 RX ORDER — OMEPRAZOLE 40 MG/1
40 CAPSULE, DELAYED RELEASE ORAL
Qty: 90 CAPSULE | Refills: 0 | Status: SHIPPED | OUTPATIENT
Start: 2022-09-13

## 2022-09-13 RX ORDER — SUCRALFATE 1 G/1
1 TABLET ORAL 4 TIMES DAILY
Qty: 56 TABLET | Refills: 0 | Status: SHIPPED | OUTPATIENT
Start: 2022-09-13 | End: 2022-09-27

## 2022-09-13 RX ORDER — ONDANSETRON 4 MG/1
4 TABLET, FILM COATED ORAL 3 TIMES DAILY PRN
Qty: 30 TABLET | Refills: 0 | Status: SHIPPED | OUTPATIENT
Start: 2022-09-13

## 2022-09-16 ENCOUNTER — TELEPHONE (OUTPATIENT)
Dept: ORTHOPEDIC SURGERY | Age: 52
End: 2022-09-16

## 2022-09-19 ENCOUNTER — PREP FOR PROCEDURE (OUTPATIENT)
Dept: SURGERY | Age: 52
End: 2022-09-19

## 2022-09-21 ENCOUNTER — OFFICE VISIT (OUTPATIENT)
Dept: ORTHOPEDIC SURGERY | Age: 52
End: 2022-09-21
Payer: COMMERCIAL

## 2022-09-21 DIAGNOSIS — M96.1 LUMBAR POST-LAMINECTOMY SYNDROME: ICD-10-CM

## 2022-09-21 DIAGNOSIS — M54.16 LUMBAR RADICULOPATHY: Primary | ICD-10-CM

## 2022-09-21 PROCEDURE — G8417 CALC BMI ABV UP PARAM F/U: HCPCS | Performed by: PHYSICIAN ASSISTANT

## 2022-09-21 PROCEDURE — G8428 CUR MEDS NOT DOCUMENT: HCPCS | Performed by: PHYSICIAN ASSISTANT

## 2022-09-21 PROCEDURE — 1036F TOBACCO NON-USER: CPT | Performed by: PHYSICIAN ASSISTANT

## 2022-09-21 PROCEDURE — 99214 OFFICE O/P EST MOD 30 MIN: CPT | Performed by: PHYSICIAN ASSISTANT

## 2022-09-21 NOTE — LETTER
Elenore [place]                                                     HEAVEN MILLIGAN  1970  MRN [id number]                                              ROOM NUMBER______      Radiographic Studies:    Cervical MRI      Thoracic MRI         Lumbar MRI          Pelvis MRI        CONTRAST    CT Myelogram: _______________   NCS/EMG ________________ ( UE  /  LE )     MRI of ___________________          Other: ____________________      Injections:    KNEE    HIP  Depomedrol _____ mg Euflexxa _____    _______________ TFESI/SNRB  _______________ SI Joint  _______________ MARISSA    _______________ Facet  _______________Piriformis/ Sciatica      Medications:    Oral Steroids _______________  NSAIDS _______________    Muscle Relaxers _______________  Neurontin/Lyrica _______________    Pain Medicine _______________  Other _______________                       Physical Therapy:    Lumbar     Thoracic      Cervical     Hip       Knee       Shoulder               Traction          Ultrasound          Dry Needling      Referral:    Pain referral:  [facility]   [facility]   Other: ______________________________    Follow-up/ Refer__________________________________________________    Authorization to hold blood thinners:___________________________________

## 2022-09-21 NOTE — PROGRESS NOTES
Name: Adonis Swift  YOB: 1970  Gender: female  MRN: [id number]    CC: Back Pain (MRI results)       HPI: This is a 54y.o. year old female who is referred to me by Dipti, physician assistant. This is a patient who presented with bilateral knee pain. 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 Patient does have history of lumbar surgery in 2018 in which she had a large disc herniation at L5S1. She does report she had relief of symptoms after that surgery however sometime around a year ago she began having back pain again bilateral hip and leg pain but more prominent radicular symptoms in the leg. Over the course of the years she has had increased weakness of the right lower extremity. She had a myelogram CT scan May 2021 that was ordered by Dr. Cathy Carreno. Per Dr. David Calderón note the scan did show some encasement around the right S1 nerve root likely due to epidural fibrosis. She was referred for a right selective nerve root block at S1. She went toDr. Cecile Sterling for that by Dr. Cecile Sterling would not do the shot because of the fibrosis. She tried Lyrica without relief. She also reports weakness in the right leg with leg with hip flexion. She has pain that radiates up the back. She denies pain in her arms and no numbness or tingling in the arms. No weakness of the upper extremities. Other treatment has included 6 weeks of physical therapy, oral steroids. This patient  has had lumbar surgery in the past.     We ordered an MRI scan with and without contrast to evaluate right S1 nerve root with recurrent disc herniation or epidural fibrosis. AMB PAIN ASSESSMENT 9/8/2022   Location of Pain Back   Location Modifiers Right;Left   Severity of Pain 6   Quality of Pain Aching; Sharp   Duration of Pain Persistent   Frequency of Pain Constant   Date Pain First Started 2/1/2018   Aggravating Factors Walking;Standing;Stairs Limiting Behavior Yes   Relieving Factors Nsaids; Other (Comment)   Result of Injury No   Work-Related Injury No   Are there other pain locations you wish to document? No              ROS/Meds/PSH/PMH/FH/SH: I personally reviewed the patient's collected intake data. Below are the pertinents:    Allergies   Allergen Reactions    Adhesive Tape Itching and Rash    Gluten Diarrhea, Dizziness or Vertigo, Palpitations and Swelling         Current Outpatient Medications:     sucralfate (CARAFATE) 1 GM tablet, Take 1 tablet by mouth 4 times daily for 14 days Dissolve in water and drink. , Disp: 56 tablet, Rfl: 0    omeprazole (PRILOSEC) 40 MG delayed release capsule, Take 1 capsule by mouth every morning (before breakfast), Disp: 90 capsule, Rfl: 0    ondansetron (ZOFRAN) 4 MG tablet, Take 1 tablet by mouth 3 times daily as needed for Nausea or Vomiting, Disp: 30 tablet, Rfl: 0    predniSONE 10 MG (21) TBPK, See administration instruction per 10 mg dose pack, Disp: 21 each, Rfl: 0    ascorbic acid (VITAMIN C) 100 MG tablet, by Oral/Gastric Tube route, Disp: , Rfl:     Calcium Carbonate-Vitamin D (OYSTER SHELL CALCIUM/D) 500-200 MG-UNIT TABS, by Oral/Gastric Tube route, Disp: , Rfl:     Magnesium Gluconate 550 MG TABS, Take by mouth, Disp: , Rfl:     potassium gluconate 550 mg tablet, Take by mouth, Disp: , Rfl:     zonisamide (ZONEGRAN) 50 MG capsule, Take 50 mg by mouth in the morning and 50 mg before bedtime. , Disp: , Rfl:     B Complex Vitamins (VITAMIN B COMPLEX PO), Take 1 tablet by mouth daily, Disp: , Rfl:     MAGNESIUM-POTASSIUM PO, Take by mouth, Disp: , Rfl:     diclofenac (VOLTAREN) 75 MG EC tablet, Take 1 tablet by mouth in the morning and 1 tablet before bedtime. Do all this for 14 days.  Take 1 tablet BID for 2 weeks as needed for pain., Disp: 28 tablet, Rfl: 0    calcium citrate (CALCITRATE) 950 (200 Ca) MG tablet, Take by mouth daily, Disp: , Rfl:     carbonyl iron (FEOSOL) 45 MG TABS, Take 1 tablet by mouth facet arthropathy mild degenerative disc changes specially at L5-S1, degenerative changes T12-L1. No spondylolisthesis. Impression: Lumbar facet arthropathy and degenerative changes      I also independently reviewed the Myelogram CT scan from May 2021 and I do see a large defect at L5-S1 on the right that is obliterating the right S1 nerve root. The etiology of this whether a scar tissue or recurrent disc herniation is difficult to ascertain with the myelogram CT scan. This would better be imaged with an MRI scan. There is also T12-L1 central to leftward disc protrusion that just abuts the conus. Narrative   Title:  Lumbar CT myelogram.       Indication:  Dx: Lumbar radiculopathy [M54.16 (ICD-10-CM)]; DDD (degenerative   disc disease), lumbar [M51.36 (ICD-10-CM)]; Morbid obesity ([facility] [address] [E66.01   (ICD-10-CM)]       Technique:  Axial images through the lumbar spine were obtained after the   intrathecal administration of contrast media. Intrathecal contrast was used to   best identify abnormal thecal sac impression. Images were reformatted in the   coronal and axial planes. All CT scans at this facility are performed using dose reduction/dose modulation   techniques, as appropriate the performed exam, including the following:    Automated Exposure Control; Adjustment of the mA and/or kV according to patient   size (this includes techniques or standardized protocols for targeted exams   where dose is matched to indication/reason for exam); and Use of Iterative   Reconstruction Technique. Comparison:  None. Findings:  Visualized portions of the abdomen and pelvis are within normal   limits. .        T12-L1:  Central disc herniation with mild spinal canal stenosis. No significant   neural foraminal stenosis. L1-L2:  No spinal canal or neural foraminal stenosis. L2-L3:  No spinal canal or neural foraminal stenosis. L3-L4:  Mild facet hypertrophy.  No spinal canal or neural foraminal stenosis. L4-L5:  Mild facet hypertrophy. Left eccentric broad-based disc bulge with mild   narrowing of the left neural foramina. No significant right neuroforaminal   narrowing. No spinal canal stenosis. L5-S1: Moderate facet hypertrophy. Postsurgical changes of prior discectomy at   L5/S1. There is obliteration of the CSF space surrounding the right L5 nerve   root with associated surrounding soft tissue density which may represent   postsurgical fibrosis or recurrent disc herniation. There appears to be at least   moderate right L5/S1 neural foraminal stenosis. Impression   1. Postsurgical changes of prior discectomy at L5/S1. There is obliteration of   the CSF space surrounding the right L5 nerve root with associated surrounding   soft tissue density which may represent postsurgical fibrosis or recurrent disc   herniation. There appears to be at least moderate right L5/S1 neural foraminal   stenosis. 2.  Central disc herniation at T12/L1 mild spinal canal stenosis. MRI Result (most recent): MRI LUMBAR SPINE WO CONTRAST 09/15/2022    Narrative  Exam: MRI lumbar spine without contrast.  Indication: Low back pain  Comparison: Lumbar spine radiographs, 9/8/2022. CT myelogram lumbar spine,  5/5/2021  Contrast: None  Technique: Multiplanar multisequence imaging of the lumbar spine was performed  without contrast.    FINDINGS:  There are 5 nonrib-bearing lumbar-type vertebral bodies. Alignment is  maintained. Vertebral body heights are preserved. No fracture or aggressive  marrow signal abnormality. Small benign osseous hemangioma in the L4 vertebral  body. The conus medullaris terminates in expected position. Cauda equina nerve  roots are unremarkable. Perivertebral soft tissues are unremarkable. Multilevel  disc desiccation with varying degrees of mild disc space narrowing, similar to  prior.     T11-T12: Left paracentral annular fissure and protrusion with mild bilateral  facet arthropathy. No spinal canal or neuroforaminal stenosis. Findings are  unchanged. T12-L1: Central disc protrusion without spinal canal or neuroforaminal stenosis. Findings are unchanged. L1-L2: No spinal canal or neuroforaminal stenosis. L2-L3: No spinal canal or neuroforaminal stenosis. L3-L4: Shallow far left lateral disc protrusion without spinal canal or  neuroforaminal stenosis. L4-L5: Left foraminal annular fissure with shallow protrusion. Mild bilateral  facet arthropathy. No spinal canal or right neuroforaminal stenosis. Mild left  neuroforaminal stenosis. Findings are unchanged. L5-S1: Diffuse disc bulge with bilateral facet arthropathy and ligamentum flavum  hypertrophy. There is T1 hypointense signal in the right lateral recess encasing  the traversing right S1 nerve root. No spinal canal or left neuroforaminal  stenosis. Mild right neuroforaminal stenosis. Findings are unchanged. Impression  1. No significant change from May 2021 CT myelogram when accounting for  differences in modality. 2. Multilevel degenerative disc disease and facet arthropathy resulting in mild  left L4-L5 and right L5-S1 neuroforaminal stenoses. 3. T1 hypointense signal in the right L5-S1 lateral recess encasing the  traversing right S1 nerve root, most likely representing granulation tissue. I independently reviewed the MRI of the lumbar spine does not look to appear that there is a recurrent disc herniation. There is a T1 hypointense signal in the right L5-S1 lateral recess around the right S1 nerve root but it appears to be granulation tissue and not disc herniation. Other levels without significant stenosis. Assessment/Plan:       Diagnosis Orders   1. Lumbar radiculopathy        2.  Lumbar post-laminectomy syndrome          Reviewed the MRI scan of the lumbar spine and assured her that there is no new disc herniation appears to be some scar tissue or granulation around the right S1 nerve root. I think this is more of a chronic radiculitis but no compressive pathology that could be surgically addressed. So I reassured her that she does not need surgery. She may benefit from a right S1 selective nerve root block. I am recommending to refer her to pain specialist to consider that also other considerations would be spinal cord stimulator.      - Referral to pain management. The patient's care would be best managed in a formal pain management setting and will be referred accordingly. 4 This is a chronic illness/condition with exacerbation and progression    No orders of the defined types were placed in this encounter. No orders of the defined types were placed in this encounter. Return for referral to pain management. Susan Chan PA-C  09/21/22      Elements of this note were created using speech recognition software. As such, errors of speech recognition may be present.

## 2022-09-26 ENCOUNTER — HOSPITAL ENCOUNTER (OUTPATIENT)
Dept: SURGERY | Age: 52
Discharge: HOME | End: 2022-09-29
Payer: SELF-PAY

## 2022-09-26 VITALS
HEART RATE: 75 BPM | TEMPERATURE: 97.7 F | OXYGEN SATURATION: 97 % | WEIGHT: 291.4 LBS | BODY MASS INDEX: 45.74 KG/M2 | HEIGHT: 67 IN | DIASTOLIC BLOOD PRESSURE: 76 MMHG | RESPIRATION RATE: 16 BRPM | SYSTOLIC BLOOD PRESSURE: 151 MMHG

## 2022-09-26 LAB
ANION GAP SERPL CALC-SCNC: 3 MMOL/L (ref 4–13)
BUN SERPL-MCNC: 13 MG/DL (ref 6–23)
CALCIUM SERPL-MCNC: 9.3 MG/DL (ref 8.3–10.4)
CHLORIDE SERPL-SCNC: 107 MMOL/L (ref 101–110)
CO2 SERPL-SCNC: 29 MMOL/L (ref 21–32)
CREAT SERPL-MCNC: 0.65 MG/DL (ref 0.6–1)
ERYTHROCYTE [DISTWIDTH] IN BLOOD BY AUTOMATED COUNT: 12.8 % (ref 11.9–14.6)
GLUCOSE SERPL-MCNC: 99 MG/DL (ref 65–100)
HCT VFR BLD AUTO: 40.3 % (ref 35.8–46.3)
HGB BLD-MCNC: 13.2 G/DL (ref 11.7–15.4)
MCH RBC QN AUTO: 30.5 PG (ref 26.1–32.9)
MCHC RBC AUTO-ENTMCNC: 32.8 G/DL (ref 31.4–35)
MCV RBC AUTO: 93.1 FL (ref 79.6–97.8)
NRBC # BLD: 0 K/UL (ref 0–0.2)
PLATELET # BLD AUTO: 211 K/UL (ref 150–450)
PMV BLD AUTO: 9.9 FL (ref 9.4–12.3)
POTASSIUM SERPL-SCNC: 3.7 MMOL/L (ref 3.5–5.1)
RBC # BLD AUTO: 4.33 M/UL (ref 4.05–5.2)
SODIUM SERPL-SCNC: 139 MMOL/L (ref 136–145)
WBC # BLD AUTO: 6.7 K/UL (ref 4.3–11.1)

## 2022-09-26 PROCEDURE — 85027 COMPLETE CBC AUTOMATED: CPT

## 2022-09-26 PROCEDURE — 80048 BASIC METABOLIC PNL TOTAL CA: CPT

## 2022-09-26 NOTE — PROGRESS NOTES
Patient verified name and     Order for consent not found in EHR and unable to match consent with case posting; patient verified. Type 3 surgery, walk in assessment complete. Labs per surgeon: unknown, no orders; Labs per anesthesia protocol: CBC and BMP; results within anesthesia guidelines  EKG: none needed per protocol    MRSA/MSSA swab collected; pharmacy to review and dose antibiotic as appropriate. Hospital approved surgical skin cleanser and instructions given per hospital policy. Patient provided with and instructed on educational handouts including Guide to Surgery, Pain Management, Hand Hygiene, Blood Transfusion Education, and [facility] Brochure. Patient answered medical/surgical history questions at their best of ability. All prior to admission medications documented in [facility]. Original medication prescription bottle  visualized during patient appointment. Patient instructed to hold all vitamins 7 days prior to surgery and NSAIDS 5 days prior to surgery, patient verbalized understanding. Patient teach back successful and patient demonstrates knowledge of instructions.

## 2022-09-26 NOTE — PROGRESS NOTES
PLEASE CONTINUE TAKING ALL PRESCRIPTION MEDICATIONS UP TO THE DAY OF SURGERY UNLESS OTHERWISE DIRECTED BELOW. DISCONTINUE all vitamins and supplements 7 days prior to surgery. DISCONTINUE Non-Steriodal Anti-Inflammatory (NSAIDS) such as Advil and Aleve 5 days prior to surgery. Home Medications to take  the day of surgery               Home Medications   to Hold           Comments    BRING CPAP    On the day before surgery please take Acetaminophen 1000mg in the morning and then again before bed. You may substitute for Tylenol 650 mg. Please do not bring home medications with you on the day of surgery unless otherwise directed by your nurse. If you are instructed to bring home medications, please give them to your nurse as they will be administered by the nursing staff. If you have any questions, please call [address] [place] [address] [phone] or [facility] [phone]. A copy of this note was provided to the patient for reference.

## 2022-09-26 NOTE — PROGRESS NOTES
SURGICAL WEIGHT LOSS Enhanced Recovery After Surgery: diabetic and non-diabetic patients      The evening before surgery 10/9/22, drink 1 bottle of the Ensure Pre-Surgery drink. Please do not eat or drink after midnight the night before your surgery. Bring your patient handbook with you to the hospital.        Things to Remember:      1. You will be up in a chair the evening of surgery and sipping on clear liquids, once released by your surgeon. 2. Beginning the day of surgery, you will be out of the bed as able, once released by your hospital team. We encourage you to be sitting up in a chair, walking in the benitez, doing exercises as advised by physical therapy, etc.       3. You will be given scheduled non-narcotic pain medication to help keep your pain under control. You will have stronger pain medication ordered for break through pain. 4. All of these measures are geared toward improving your overall surgical recovery and   decreasing the risk of complications.

## 2022-09-26 NOTE — PROGRESS NOTES
Latest Reference Range & Units 9/26/22 14:09   Sodium 136 - 145 mmol/L 139   Potassium 3.5 - 5.1 mmol/L 3.7   Chloride 101 - 110 mmol/L 107   CO2 21 - 32 mmol/L 29   BUN,BUNPL 6 - 23 MG/DL 13   Creatinine 0.6 - 1.0 MG/DL 0.65   Anion Gap 4 - 13 mmol/L 3 (L)   GFR Non-African American >60 ml/min/1.73m2 >60   GFR  >60 ml/min/1.73m2 >60   Glucose, Random 65 - 100 mg/dL 99   CALCIUM, SERUM, 389267 8.3 - 10.4 MG/DL 9.3   WBC 4.3 - 11.1 K/uL 6.7   RBC 4.05 - 5.2 M/uL 4.33   Hemoglobin Quant 11.7 - 15.4 g/dL 13.2   Hematocrit 35.8 - 46.3 % 40.3   MCV 79.6 - 97.8 FL 93.1   MCH 26.1 - 32.9 PG 30.5   MCHC 31.4 - 35.0 g/dL 32.8   MPV 9.4 - 12.3 FL 9.9   RDW 11.9 - 14.6 % 12.8   Platelet Count 747 - 450 K/uL 211   Nucleated Red Blood Cells 0.0 - 0.2 K/uL 0.00   (L): Data is abnormally low

## 2022-10-06 RX ORDER — SODIUM CHLORIDE 0.9 % (FLUSH) 0.9 %
5-40 SYRINGE (ML) INJECTION PRN
Status: CANCELLED | OUTPATIENT
Start: 2022-10-06

## 2022-10-09 ENCOUNTER — ANESTHESIA EVENT (OUTPATIENT)
Dept: SURGERY | Age: 52
DRG: 621 | End: 2022-10-09
Payer: SELF-PAY

## 2022-10-10 ENCOUNTER — ANESTHESIA (OUTPATIENT)
Dept: SURGERY | Age: 52
DRG: 621 | End: 2022-10-10
Payer: SELF-PAY

## 2022-10-10 ENCOUNTER — HOSPITAL ENCOUNTER (INPATIENT)
Age: 52
LOS: 1 days | Discharge: HOME | DRG: 621 | End: 2022-10-11
Attending: SURGERY | Admitting: SURGERY
Payer: SELF-PAY

## 2022-10-10 LAB
ABO + RH BLD: NORMAL
BLOOD GROUP ANTIBODIES SERPL: NORMAL
HCG UR QL: NEGATIVE
SPECIMEN EXP DATE BLD: NORMAL

## 2022-10-10 PROCEDURE — 81025 URINE PREGNANCY TEST: CPT

## 2022-10-10 PROCEDURE — 1100000000 HC RM PRIVATE

## 2022-10-10 PROCEDURE — 6360000002 HC RX W HCPCS: Performed by: SURGERY

## 2022-10-10 PROCEDURE — 3700000000 HC ANESTHESIA ATTENDED CARE: Performed by: SURGERY

## 2022-10-10 PROCEDURE — 3700000001 HC ADD 15 MINUTES (ANESTHESIA): Performed by: SURGERY

## 2022-10-10 PROCEDURE — 6360000002 HC RX W HCPCS: Performed by: NURSE ANESTHETIST, CERTIFIED REGISTERED

## 2022-10-10 PROCEDURE — 2500000003 HC RX 250 WO HCPCS: Performed by: NURSE ANESTHETIST, CERTIFIED REGISTERED

## 2022-10-10 PROCEDURE — 2580000003 HC RX 258: Performed by: PHYSICIAN ASSISTANT

## 2022-10-10 PROCEDURE — 6360000002 HC RX W HCPCS: Performed by: ANESTHESIOLOGY

## 2022-10-10 PROCEDURE — 2500000003 HC RX 250 WO HCPCS: Performed by: SURGERY

## 2022-10-10 PROCEDURE — 6360000002 HC RX W HCPCS: Performed by: PHYSICIAN ASSISTANT

## 2022-10-10 PROCEDURE — 2720000010 HC SURG SUPPLY STERILE: Performed by: SURGERY

## 2022-10-10 PROCEDURE — 86901 BLOOD TYPING SEROLOGIC RH(D): CPT

## 2022-10-10 PROCEDURE — 7100000000 HC PACU RECOVERY - FIRST 15 MIN: Performed by: SURGERY

## 2022-10-10 PROCEDURE — 6370000000 HC RX 637 (ALT 250 FOR IP): Performed by: ANESTHESIOLOGY

## 2022-10-10 PROCEDURE — 3600000009 HC SURGERY ROBOT BASE: Performed by: SURGERY

## 2022-10-10 PROCEDURE — 0DB64Z3 EXCISION OF STOMACH, PERCUTANEOUS ENDOSCOPIC APPROACH, VERTICAL: ICD-10-PCS | Performed by: SURGERY

## 2022-10-10 PROCEDURE — 6370000000 HC RX 637 (ALT 250 FOR IP): Performed by: PHYSICIAN ASSISTANT

## 2022-10-10 PROCEDURE — 2500000003 HC RX 250 WO HCPCS: Performed by: PHYSICIAN ASSISTANT

## 2022-10-10 PROCEDURE — 88307 TISSUE EXAM BY PATHOLOGIST: CPT

## 2022-10-10 PROCEDURE — 43775 LAP SLEEVE GASTRECTOMY: CPT | Performed by: SURGERY

## 2022-10-10 PROCEDURE — 8E0W4CZ ROBOTIC ASSISTED PROCEDURE OF TRUNK REGION, PERCUTANEOUS ENDOSCOPIC APPROACH: ICD-10-PCS | Performed by: SURGERY

## 2022-10-10 PROCEDURE — 3600000019 HC SURGERY ROBOT ADDTL 15MIN: Performed by: SURGERY

## 2022-10-10 PROCEDURE — 0DJ08ZZ INSPECTION OF UPPER INTESTINAL TRACT, VIA NATURAL OR ARTIFICIAL OPENING ENDOSCOPIC: ICD-10-PCS | Performed by: SURGERY

## 2022-10-10 PROCEDURE — 88312 SPECIAL STAINS GROUP 1: CPT

## 2022-10-10 PROCEDURE — S2900 ROBOTIC SURGICAL SYSTEM: HCPCS | Performed by: SURGERY

## 2022-10-10 PROCEDURE — 7100000001 HC PACU RECOVERY - ADDTL 15 MIN: Performed by: SURGERY

## 2022-10-10 PROCEDURE — 94760 N-INVAS EAR/PLS OXIMETRY 1: CPT

## 2022-10-10 PROCEDURE — 2709999900 HC NON-CHARGEABLE SUPPLY: Performed by: SURGERY

## 2022-10-10 RX ORDER — DIPHENHYDRAMINE HYDROCHLORIDE 50 MG/ML
12.5 INJECTION INTRAMUSCULAR; INTRAVENOUS
Status: DISCONTINUED | OUTPATIENT
Start: 2022-10-10 | End: 2022-10-10 | Stop reason: HOSPADM

## 2022-10-10 RX ORDER — SIMETHICONE 80 MG
80 TABLET,CHEWABLE ORAL EVERY 6 HOURS PRN
Status: DISCONTINUED | OUTPATIENT
Start: 2022-10-10 | End: 2022-10-11 | Stop reason: HOSPADM

## 2022-10-10 RX ORDER — BUPIVACAINE HYDROCHLORIDE 5 MG/ML
INJECTION, SOLUTION EPIDURAL; INTRACAUDAL PRN
Status: DISCONTINUED | OUTPATIENT
Start: 2022-10-10 | End: 2022-10-10 | Stop reason: HOSPADM

## 2022-10-10 RX ORDER — MIDAZOLAM HYDROCHLORIDE 2 MG/2ML
2 INJECTION, SOLUTION INTRAMUSCULAR; INTRAVENOUS
Status: DISCONTINUED | OUTPATIENT
Start: 2022-10-10 | End: 2022-10-10 | Stop reason: HOSPADM

## 2022-10-10 RX ORDER — SODIUM CHLORIDE 0.9 % (FLUSH) 0.9 %
5-40 SYRINGE (ML) INJECTION PRN
Status: DISCONTINUED | OUTPATIENT
Start: 2022-10-10 | End: 2022-10-11 | Stop reason: HOSPADM

## 2022-10-10 RX ORDER — METOCLOPRAMIDE HYDROCHLORIDE 5 MG/ML
10 INJECTION INTRAMUSCULAR; INTRAVENOUS ONCE
Status: DISCONTINUED | OUTPATIENT
Start: 2022-10-10 | End: 2022-10-10 | Stop reason: HOSPADM

## 2022-10-10 RX ORDER — SCOLOPAMINE TRANSDERMAL SYSTEM 1 MG/1
1 PATCH, EXTENDED RELEASE TRANSDERMAL
Status: DISCONTINUED | OUTPATIENT
Start: 2022-10-10 | End: 2022-10-10 | Stop reason: HOSPADM

## 2022-10-10 RX ORDER — OXYCODONE HYDROCHLORIDE 5 MG/1
5 TABLET ORAL PRN
Status: DISCONTINUED | OUTPATIENT
Start: 2022-10-10 | End: 2022-10-10 | Stop reason: HOSPADM

## 2022-10-10 RX ORDER — ONDANSETRON 2 MG/ML
INJECTION INTRAMUSCULAR; INTRAVENOUS PRN
Status: DISCONTINUED | OUTPATIENT
Start: 2022-10-10 | End: 2022-10-10 | Stop reason: SDUPTHER

## 2022-10-10 RX ORDER — HEPARIN SODIUM 5000 [USP'U]/ML
5000 INJECTION, SOLUTION INTRAVENOUS; SUBCUTANEOUS ONCE
Status: DISCONTINUED | OUTPATIENT
Start: 2022-10-10 | End: 2022-10-10 | Stop reason: HOSPADM

## 2022-10-10 RX ORDER — SODIUM CHLORIDE, SODIUM LACTATE, POTASSIUM CHLORIDE, CALCIUM CHLORIDE 600; 310; 30; 20 MG/100ML; MG/100ML; MG/100ML; MG/100ML
INJECTION, SOLUTION INTRAVENOUS CONTINUOUS
Status: DISCONTINUED | OUTPATIENT
Start: 2022-10-10 | End: 2022-10-10 | Stop reason: SDUPTHER

## 2022-10-10 RX ORDER — KETAMINE HYDROCHLORIDE 50 MG/ML
INJECTION, SOLUTION, CONCENTRATE INTRAMUSCULAR; INTRAVENOUS PRN
Status: DISCONTINUED | OUTPATIENT
Start: 2022-10-10 | End: 2022-10-10 | Stop reason: SDUPTHER

## 2022-10-10 RX ORDER — DIPHENHYDRAMINE HYDROCHLORIDE 50 MG/ML
25 INJECTION INTRAMUSCULAR; INTRAVENOUS EVERY 6 HOURS PRN
Status: DISCONTINUED | OUTPATIENT
Start: 2022-10-10 | End: 2022-10-11 | Stop reason: HOSPADM

## 2022-10-10 RX ORDER — DIPHENHYDRAMINE HCL 25 MG
25 CAPSULE ORAL EVERY 6 HOURS PRN
Status: DISCONTINUED | OUTPATIENT
Start: 2022-10-10 | End: 2022-10-11 | Stop reason: HOSPADM

## 2022-10-10 RX ORDER — PROCHLORPERAZINE EDISYLATE 5 MG/ML
10 INJECTION INTRAMUSCULAR; INTRAVENOUS EVERY 6 HOURS PRN
Status: DISCONTINUED | OUTPATIENT
Start: 2022-10-10 | End: 2022-10-11 | Stop reason: HOSPADM

## 2022-10-10 RX ORDER — HYDRALAZINE HYDROCHLORIDE 20 MG/ML
10 INJECTION INTRAMUSCULAR; INTRAVENOUS EVERY 6 HOURS PRN
Status: DISCONTINUED | OUTPATIENT
Start: 2022-10-10 | End: 2022-10-11 | Stop reason: HOSPADM

## 2022-10-10 RX ORDER — SODIUM CHLORIDE AND POTASSIUM CHLORIDE .9; .15 G/100ML; G/100ML
SOLUTION INTRAVENOUS CONTINUOUS
Status: DISCONTINUED | OUTPATIENT
Start: 2022-10-10 | End: 2022-10-11 | Stop reason: HOSPADM

## 2022-10-10 RX ORDER — SODIUM CHLORIDE 9 MG/ML
INJECTION, SOLUTION INTRAVENOUS PRN
Status: DISCONTINUED | OUTPATIENT
Start: 2022-10-10 | End: 2022-10-10 | Stop reason: HOSPADM

## 2022-10-10 RX ORDER — LIDOCAINE HYDROCHLORIDE ANHYDROUS AND DEXTROSE MONOHYDRATE .4; 5 G/100ML; G/100ML
INJECTION, SOLUTION INTRAVENOUS CONTINUOUS PRN
Status: DISCONTINUED | OUTPATIENT
Start: 2022-10-10 | End: 2022-10-10 | Stop reason: SDUPTHER

## 2022-10-10 RX ORDER — OXYCODONE HYDROCHLORIDE 5 MG/1
5 TABLET ORAL EVERY 4 HOURS PRN
Status: DISCONTINUED | OUTPATIENT
Start: 2022-10-10 | End: 2022-10-11 | Stop reason: HOSPADM

## 2022-10-10 RX ORDER — APREPITANT 40 MG/1
40 CAPSULE ORAL ONCE
Status: COMPLETED | OUTPATIENT
Start: 2022-10-10 | End: 2022-10-10

## 2022-10-10 RX ORDER — LIDOCAINE HYDROCHLORIDE 20 MG/ML
INJECTION, SOLUTION EPIDURAL; INFILTRATION; INTRACAUDAL; PERINEURAL PRN
Status: DISCONTINUED | OUTPATIENT
Start: 2022-10-10 | End: 2022-10-10 | Stop reason: SDUPTHER

## 2022-10-10 RX ORDER — NEOSTIGMINE METHYLSULFATE 1 MG/ML
INJECTION, SOLUTION INTRAVENOUS PRN
Status: DISCONTINUED | OUTPATIENT
Start: 2022-10-10 | End: 2022-10-10 | Stop reason: SDUPTHER

## 2022-10-10 RX ORDER — ACETAMINOPHEN 500 MG
1000 TABLET ORAL ONCE
Status: DISCONTINUED | OUTPATIENT
Start: 2022-10-10 | End: 2022-10-10 | Stop reason: HOSPADM

## 2022-10-10 RX ORDER — HEPARIN SODIUM 5000 [USP'U]/ML
5000 INJECTION, SOLUTION INTRAVENOUS; SUBCUTANEOUS EVERY 8 HOURS SCHEDULED
Status: DISCONTINUED | OUTPATIENT
Start: 2022-10-10 | End: 2022-10-11 | Stop reason: HOSPADM

## 2022-10-10 RX ORDER — SODIUM CHLORIDE 0.9 % (FLUSH) 0.9 %
5-40 SYRINGE (ML) INJECTION EVERY 12 HOURS SCHEDULED
Status: DISCONTINUED | OUTPATIENT
Start: 2022-10-10 | End: 2022-10-11 | Stop reason: HOSPADM

## 2022-10-10 RX ORDER — SODIUM CHLORIDE, SODIUM LACTATE, POTASSIUM CHLORIDE, CALCIUM CHLORIDE 600; 310; 30; 20 MG/100ML; MG/100ML; MG/100ML; MG/100ML
INJECTION, SOLUTION INTRAVENOUS CONTINUOUS
Status: DISCONTINUED | OUTPATIENT
Start: 2022-10-10 | End: 2022-10-10 | Stop reason: HOSPADM

## 2022-10-10 RX ORDER — ACETAMINOPHEN 500 MG
1000 TABLET ORAL EVERY 6 HOURS
Status: DISCONTINUED | OUTPATIENT
Start: 2022-10-10 | End: 2022-10-11 | Stop reason: HOSPADM

## 2022-10-10 RX ORDER — SODIUM CHLORIDE 0.9 % (FLUSH) 0.9 %
5-40 SYRINGE (ML) INJECTION EVERY 12 HOURS SCHEDULED
Status: DISCONTINUED | OUTPATIENT
Start: 2022-10-10 | End: 2022-10-10 | Stop reason: HOSPADM

## 2022-10-10 RX ORDER — KETOROLAC TROMETHAMINE 30 MG/ML
30 INJECTION, SOLUTION INTRAMUSCULAR; INTRAVENOUS EVERY 6 HOURS
Status: COMPLETED | OUTPATIENT
Start: 2022-10-10 | End: 2022-10-11

## 2022-10-10 RX ORDER — HYDROMORPHONE HYDROCHLORIDE 1 MG/ML
0.5 INJECTION, SOLUTION INTRAMUSCULAR; INTRAVENOUS; SUBCUTANEOUS EVERY 5 MIN PRN
Status: DISCONTINUED | OUTPATIENT
Start: 2022-10-10 | End: 2022-10-10 | Stop reason: HOSPADM

## 2022-10-10 RX ORDER — SODIUM CHLORIDE 9 MG/ML
INJECTION, SOLUTION INTRAVENOUS PRN
Status: DISCONTINUED | OUTPATIENT
Start: 2022-10-10 | End: 2022-10-11 | Stop reason: HOSPADM

## 2022-10-10 RX ORDER — SODIUM CHLORIDE 0.9 % (FLUSH) 0.9 %
5-40 SYRINGE (ML) INJECTION PRN
Status: DISCONTINUED | OUTPATIENT
Start: 2022-10-10 | End: 2022-10-10 | Stop reason: HOSPADM

## 2022-10-10 RX ORDER — SCOLOPAMINE TRANSDERMAL SYSTEM 1 MG/1
1 PATCH, EXTENDED RELEASE TRANSDERMAL ONCE
Status: DISCONTINUED | OUTPATIENT
Start: 2022-10-10 | End: 2022-10-11 | Stop reason: HOSPADM

## 2022-10-10 RX ORDER — ONDANSETRON 2 MG/ML
4 INJECTION INTRAMUSCULAR; INTRAVENOUS ONCE
Status: DISCONTINUED | OUTPATIENT
Start: 2022-10-10 | End: 2022-10-10 | Stop reason: HOSPADM

## 2022-10-10 RX ORDER — ROCURONIUM BROMIDE 10 MG/ML
INJECTION, SOLUTION INTRAVENOUS PRN
Status: DISCONTINUED | OUTPATIENT
Start: 2022-10-10 | End: 2022-10-10 | Stop reason: SDUPTHER

## 2022-10-10 RX ORDER — LIDOCAINE HYDROCHLORIDE ANHYDROUS AND DEXTROSE MONOHYDRATE .4; 5 G/100ML; G/100ML
1 INJECTION, SOLUTION INTRAVENOUS CONTINUOUS
Status: DISCONTINUED | OUTPATIENT
Start: 2022-10-10 | End: 2022-10-11 | Stop reason: HOSPADM

## 2022-10-10 RX ORDER — SODIUM CHLORIDE, SODIUM LACTATE, POTASSIUM CHLORIDE, CALCIUM CHLORIDE 600; 310; 30; 20 MG/100ML; MG/100ML; MG/100ML; MG/100ML
INJECTION, SOLUTION INTRAVENOUS CONTINUOUS
Status: DISCONTINUED | OUTPATIENT
Start: 2022-10-10 | End: 2022-10-11 | Stop reason: HOSPADM

## 2022-10-10 RX ORDER — GABAPENTIN 300 MG/1
300 CAPSULE ORAL 3 TIMES DAILY
Status: DISCONTINUED | OUTPATIENT
Start: 2022-10-10 | End: 2022-10-11 | Stop reason: HOSPADM

## 2022-10-10 RX ORDER — ONDANSETRON 2 MG/ML
4 INJECTION INTRAMUSCULAR; INTRAVENOUS
Status: DISCONTINUED | OUTPATIENT
Start: 2022-10-10 | End: 2022-10-10 | Stop reason: HOSPADM

## 2022-10-10 RX ORDER — HYDROMORPHONE HYDROCHLORIDE 2 MG/ML
0.5 INJECTION, SOLUTION INTRAMUSCULAR; INTRAVENOUS; SUBCUTANEOUS
Status: DISCONTINUED | OUTPATIENT
Start: 2022-10-10 | End: 2022-10-11 | Stop reason: HOSPADM

## 2022-10-10 RX ORDER — MIDAZOLAM HYDROCHLORIDE 2 MG/2ML
2 INJECTION, SOLUTION INTRAMUSCULAR; INTRAVENOUS ONCE
Status: DISCONTINUED | OUTPATIENT
Start: 2022-10-10 | End: 2022-10-11 | Stop reason: HOSPADM

## 2022-10-10 RX ORDER — PROPOFOL 10 MG/ML
INJECTION, EMULSION INTRAVENOUS PRN
Status: DISCONTINUED | OUTPATIENT
Start: 2022-10-10 | End: 2022-10-10 | Stop reason: SDUPTHER

## 2022-10-10 RX ORDER — FENTANYL CITRATE 50 UG/ML
INJECTION, SOLUTION INTRAMUSCULAR; INTRAVENOUS PRN
Status: DISCONTINUED | OUTPATIENT
Start: 2022-10-10 | End: 2022-10-10 | Stop reason: SDUPTHER

## 2022-10-10 RX ORDER — GLYCOPYRROLATE 0.2 MG/ML
INJECTION INTRAMUSCULAR; INTRAVENOUS PRN
Status: DISCONTINUED | OUTPATIENT
Start: 2022-10-10 | End: 2022-10-10 | Stop reason: SDUPTHER

## 2022-10-10 RX ORDER — METOCLOPRAMIDE HYDROCHLORIDE 5 MG/ML
10 INJECTION INTRAMUSCULAR; INTRAVENOUS ONCE
Status: COMPLETED | OUTPATIENT
Start: 2022-10-10 | End: 2022-10-10

## 2022-10-10 RX ORDER — HEPARIN SODIUM 5000 [USP'U]/ML
5000 INJECTION, SOLUTION INTRAVENOUS; SUBCUTANEOUS ONCE
Status: COMPLETED | OUTPATIENT
Start: 2022-10-10 | End: 2022-10-10

## 2022-10-10 RX ORDER — ONDANSETRON 2 MG/ML
4 INJECTION INTRAMUSCULAR; INTRAVENOUS EVERY 6 HOURS PRN
Status: DISCONTINUED | OUTPATIENT
Start: 2022-10-10 | End: 2022-10-11 | Stop reason: HOSPADM

## 2022-10-10 RX ORDER — LIDOCAINE HYDROCHLORIDE ANHYDROUS AND DEXTROSE MONOHYDRATE .4; 5 G/100ML; G/100ML
1 INJECTION, SOLUTION INTRAVENOUS CONTINUOUS
Status: DISPENSED | OUTPATIENT
Start: 2022-10-10 | End: 2022-10-11

## 2022-10-10 RX ORDER — APREPITANT 40 MG/1
40 CAPSULE ORAL ONCE
Status: DISCONTINUED | OUTPATIENT
Start: 2022-10-10 | End: 2022-10-10 | Stop reason: HOSPADM

## 2022-10-10 RX ORDER — LIDOCAINE HYDROCHLORIDE 10 MG/ML
1 INJECTION, SOLUTION INFILTRATION; PERINEURAL
Status: DISCONTINUED | OUTPATIENT
Start: 2022-10-10 | End: 2022-10-10 | Stop reason: HOSPADM

## 2022-10-10 RX ORDER — PROCHLORPERAZINE EDISYLATE 5 MG/ML
5 INJECTION INTRAMUSCULAR; INTRAVENOUS
Status: COMPLETED | OUTPATIENT
Start: 2022-10-10 | End: 2022-10-10

## 2022-10-10 RX ORDER — SUCRALFATE 1 G/1
1 TABLET ORAL EVERY 6 HOURS SCHEDULED
Status: DISCONTINUED | OUTPATIENT
Start: 2022-10-10 | End: 2022-10-11 | Stop reason: HOSPADM

## 2022-10-10 RX ADMIN — Medication 3 MG: at 13:46

## 2022-10-10 RX ADMIN — KETAMINE HYDROCHLORIDE 20 MG: 50 INJECTION, SOLUTION INTRAMUSCULAR; INTRAVENOUS at 13:14

## 2022-10-10 RX ADMIN — OXYCODONE 5 MG: 5 TABLET ORAL at 21:17

## 2022-10-10 RX ADMIN — HYDROMORPHONE HYDROCHLORIDE 0.5 MG: 1 INJECTION, SOLUTION INTRAMUSCULAR; INTRAVENOUS; SUBCUTANEOUS at 14:25

## 2022-10-10 RX ADMIN — ONDANSETRON 4 MG: 2 INJECTION INTRAMUSCULAR; INTRAVENOUS at 17:10

## 2022-10-10 RX ADMIN — KETOROLAC TROMETHAMINE 30 MG: 30 INJECTION, SOLUTION INTRAMUSCULAR at 15:45

## 2022-10-10 RX ADMIN — GLYCOPYRROLATE 0.4 MG: 0.2 INJECTION, SOLUTION INTRAMUSCULAR; INTRAVENOUS at 13:46

## 2022-10-10 RX ADMIN — HYDROMORPHONE HYDROCHLORIDE 0.5 MG: 1 INJECTION, SOLUTION INTRAMUSCULAR; INTRAVENOUS; SUBCUTANEOUS at 14:15

## 2022-10-10 RX ADMIN — Medication 3000 MG: at 12:06

## 2022-10-10 RX ADMIN — KETAMINE HYDROCHLORIDE 20 MG: 50 INJECTION, SOLUTION INTRAMUSCULAR; INTRAVENOUS at 12:32

## 2022-10-10 RX ADMIN — APREPITANT 40 MG: 40 CAPSULE ORAL at 10:45

## 2022-10-10 RX ADMIN — HEPARIN SODIUM 5000 UNITS: 5000 INJECTION INTRAVENOUS; SUBCUTANEOUS at 10:46

## 2022-10-10 RX ADMIN — METOCLOPRAMIDE 10 MG: 5 INJECTION, SOLUTION INTRAMUSCULAR; INTRAVENOUS at 10:46

## 2022-10-10 RX ADMIN — POTASSIUM CHLORIDE AND SODIUM CHLORIDE: 900; 150 INJECTION, SOLUTION INTRAVENOUS at 15:44

## 2022-10-10 RX ADMIN — KETOROLAC TROMETHAMINE 30 MG: 30 INJECTION, SOLUTION INTRAMUSCULAR at 21:18

## 2022-10-10 RX ADMIN — LIDOCAINE HYDROCHLORIDE 1 MG/KG/HR: 4 INJECTION, SOLUTION INTRAVENOUS at 12:43

## 2022-10-10 RX ADMIN — ACETAMINOPHEN 1000 MG: 500 TABLET, FILM COATED ORAL at 17:11

## 2022-10-10 RX ADMIN — PROCHLORPERAZINE EDISYLATE 5 MG: 5 INJECTION INTRAMUSCULAR; INTRAVENOUS at 14:10

## 2022-10-10 RX ADMIN — ROCURONIUM BROMIDE 50 MG: 50 INJECTION, SOLUTION INTRAVENOUS at 12:32

## 2022-10-10 RX ADMIN — GLYCOPYRROLATE 0.2 MG: 0.2 INJECTION, SOLUTION INTRAMUSCULAR; INTRAVENOUS at 12:52

## 2022-10-10 RX ADMIN — ONDANSETRON 4 MG: 2 INJECTION INTRAMUSCULAR; INTRAVENOUS at 13:05

## 2022-10-10 RX ADMIN — HEPARIN SODIUM 5000 UNITS: 5000 INJECTION INTRAVENOUS; SUBCUTANEOUS at 17:10

## 2022-10-10 RX ADMIN — GLYCOPYRROLATE 0.2 MG: 0.2 INJECTION, SOLUTION INTRAMUSCULAR; INTRAVENOUS at 12:50

## 2022-10-10 RX ADMIN — FENTANYL CITRATE 50 MCG: 50 INJECTION, SOLUTION INTRAMUSCULAR; INTRAVENOUS at 12:47

## 2022-10-10 RX ADMIN — SODIUM CHLORIDE, SODIUM LACTATE, POTASSIUM CHLORIDE, AND CALCIUM CHLORIDE: 600; 310; 30; 20 INJECTION, SOLUTION INTRAVENOUS at 11:00

## 2022-10-10 RX ADMIN — THIAMINE HYDROCHLORIDE: 100 INJECTION, SOLUTION INTRAMUSCULAR; INTRAVENOUS at 17:10

## 2022-10-10 RX ADMIN — SODIUM CHLORIDE, PRESERVATIVE FREE 10 ML: 5 INJECTION INTRAVENOUS at 21:23

## 2022-10-10 RX ADMIN — PROPOFOL 200 MG: 10 INJECTION, EMULSION INTRAVENOUS at 12:32

## 2022-10-10 RX ADMIN — LIDOCAINE HYDROCHLORIDE 100 MG: 20 INJECTION, SOLUTION EPIDURAL; INFILTRATION; INTRACAUDAL; PERINEURAL at 12:32

## 2022-10-10 RX ADMIN — LIDOCAINE HYDROCHLORIDE 1 MG/KG/HR: 4 INJECTION, SOLUTION INTRAVENOUS at 15:46

## 2022-10-10 RX ADMIN — GABAPENTIN 300 MG: 300 CAPSULE ORAL at 17:11

## 2022-10-10 RX ADMIN — SUCRALFATE 1 G: 1 TABLET ORAL at 23:19

## 2022-10-10 RX ADMIN — SUCRALFATE 1 G: 1 TABLET ORAL at 17:11

## 2022-10-10 RX ADMIN — ACETAMINOPHEN 1000 MG: 500 TABLET, FILM COATED ORAL at 23:19

## 2022-10-10 RX ADMIN — FENTANYL CITRATE 50 MCG: 50 INJECTION, SOLUTION INTRAMUSCULAR; INTRAVENOUS at 12:32

## 2022-10-10 RX ADMIN — OXYCODONE 5 MG: 5 TABLET ORAL at 17:34

## 2022-10-10 ASSESSMENT — PAIN DESCRIPTION - ONSET: ONSET: GRADUAL

## 2022-10-10 ASSESSMENT — PAIN DESCRIPTION - FREQUENCY: FREQUENCY: INTERMITTENT

## 2022-10-10 ASSESSMENT — PAIN SCALES - GENERAL
PAINLEVEL_OUTOF10: 5
PAINLEVEL_OUTOF10: 7
PAINLEVEL_OUTOF10: 0
PAINLEVEL_OUTOF10: 6
PAINLEVEL_OUTOF10: 6
PAINLEVEL_OUTOF10: 0
PAINLEVEL_OUTOF10: 4
PAINLEVEL_OUTOF10: 4

## 2022-10-10 ASSESSMENT — LIFESTYLE VARIABLES: SMOKING_STATUS: 0

## 2022-10-10 ASSESSMENT — PAIN SCALES - WONG BAKER: WONGBAKER_NUMERICALRESPONSE: 2

## 2022-10-10 ASSESSMENT — PAIN DESCRIPTION - PAIN TYPE: TYPE: SURGICAL PAIN

## 2022-10-10 ASSESSMENT — PAIN DESCRIPTION - LOCATION: LOCATION: ABDOMEN

## 2022-10-10 ASSESSMENT — PAIN DESCRIPTION - DESCRIPTORS: DESCRIPTORS: ACHING;CRAMPING

## 2022-10-10 NOTE — INTERVAL H&P NOTE
Update History & Physical    The patient's History and Physical of September 13, Robotic sleeve gastrectomy, EGD was reviewed with the patient and I examined the patient. There was no change. The surgical site was confirmed by the patient and me. Plan: The risks, benefits, expected outcome, and alternative to the recommended procedure have been discussed with the patient. Patient understands and wants to proceed with the procedure.      Electronically signed by Raj Sun MD on 10/10/2022 at 11:21 AM

## 2022-10-10 NOTE — OP NOTE
Operative Note      Patient: Joselyn Chandler  YOB: 1970  MRN: [id number]    Date of Procedure: 10/10/2022    [facility] [E66.01]    Post-Op Diagnosis: Same       Procedure(s):  ERAS/ GASTRECTOMY SLEEVE LAPAROSCOPIC ROBOTIC  EGD ESOPHAGOGASTRODUODENOSCOPY    Surgeon(s):  Cody Blanchard MD    Assistant:   * No surgical staff found *    Anesthesia: General    Estimated Blood Loss (mL): Minimal    Complications: None    Specimens:   ID Type Source Tests Collected by Time Destination   A : portion of stomach Tissue Stomach SURGICAL PATHOLOGY Cody Blanchard MD 10/10/2022 1334        Implants:  * No implants in log *      Drains: * No LDAs found *    Findings: Normal appearing intra-abdominal anatomy. Negative leak test. Normal appearing gastric and esophageal mucousa without staple line bleeding. Statement of Medical Necessity: Joselyn Chandler is a 54 y.o. female who presented to the clinic with history of morbid obesity and Body mass index is 45.62 kg/m². Bina Cummings She failed multiple weight loss attempts meets the NIH criteria for obesity surgery. She decided for a laparoscopic vs open sleeve gastrectomy. We discussed specific risks of sleeve gastrectomy including but not limited to: pain, infection, bleeding, scar, excess skin, conversion to open approach, hernia, injury to adjacent organs, need for blood transfusion, thromboembolic complications, gastrointestinal leak, stricture, difficulty swallowing, need for revisional surgery, gastroesophageal reflux, esophagitis or esophageal cancer, need for revisional surgery, failure to lose weight or weight regain, nutritional deficiencies, heart attack, stroke, death. Patient understood and agreed to proceed. Procedure Details   After informed consent was taken, patient was taken to the operating room and placed in supine position with padding in all pressure points.  Anesthesia was induced and patient was intubated. Patient received preoperative antibiotics. Abdomen was prepped and draped in standard sterile fashion. A timeout was performed with all team members present. A 1 cm horizontal incision was made 17 cm from sternal notch to the left of the umbilicus. A Veress needle was inserted. Saline drop test was normal. The abdomen was insufflated with carbon dioxide to a pressure of 15 mmHg. The patient tolerated the insufflation well. A 8 mm robotic trocar was inserted bluntly. A general survey was performed and no injury was observed from trocar placement. A 12 mm robotic trocar was inserted about 10 cm to the left of the previously placed port. A 8 mm robotic trocar and a 8 mm Air Seal trocar were carefully placed under direct visualization in the left upper quadrant. A 5 mm tunnel was created distal and left lateral to the xiphoid, a Andrews liver retractor advanced, and the liver retracted. A bilateral abdominal tap block was performed using Marcaine. The patient was placed in 22 degrees of reverse Trendelenberg and 6 degrees of right side tilt. The robot was docked. The robotic instruments were carefully inserted under direct visualization. A premeasured instrument was used to mindi the position 7 cm proximal to the pylorus along the greater curvature. The stomach was grasped and elevated. A Vessel sealer extend was used to divide the gastrocolic ligament and enter the lesser sac. The greater curvature of the stomach was divided free from its gastrocolic attachments in its entirety, to the level of the spleen. Next, a 43 O[place]i blunt bougie was advanced along the lesser curvature to the pylorus. Using 5 sequential firings of DaVinci 60 mm smart stapler platform, green (1), blue (2) and white (2) endostaplers, the stomach was divided making sure to not leave any redundant fundus. The greater curvature segment was placed in the right upper quadrant of the abdomen. The bougie was withdrawn.  The lower part of the sleeve was fixated to the retroperitoneum using a 3-0 Vicryl suture to prevent twisting or kinking. An esophagogastroduodenoscopy advanced into the stomach. Using distal occlusion, insufflation of the stomach and immersion in sterile saline, a leak test was performed. No leak along the staple line was present. There was also no intraluminal bleeding or abnormalities of the stomach appreciated. The air was aspirated and the esophagogastroduodenoscopy withdrawn. The intraperitoneal saline was aspirated. Hemostasis was assured. All robotic instruments were removed and the robot was undocked. The liver retractor was removed under direct vision. The stomach was removed through the 12 mm port. This incision was closed at the level of the fascia with 0-vicryl sutures and an endoclosure device. The wounds were irrigated with betadine solution and closed with 4-0 Monocryl in a subcuticular fashion. Dermabond was applied to the skin incisions. All counts were reported as correct. The patient tolerated the procedure well and there were no immediate complications. Disposition: the patient was awakened from anesthesia and transferred to the PACU in stable condition.          Signed by: Jac Lopez MD  [facility] - Bariatric & Minimally Invasive Surgery  10/10/2022 1:53 PM

## 2022-10-10 NOTE — DISCHARGE INSTRUCTIONS
Bariatric Surgery Discharge Instructions    Surgeon: Dr. Baljit Beard    Follow up: Follow up with your surgeon as previously scheduled in 1-2 weeks. [address] [place]  Office number: [phone]    Diet:  When discharged from the hospital, you may begin clear and full liquid diet plus protein supplements  Start with clear liquids and progress to full liquids as tolerated  Goals: 60 grams of protein per day, 64 ounces of fluid per day  Avoid carbonated beverages and straws, avoid excessive air swallowing when drinking/eating, minimize caffeine intake. No alcohol. Wound care:  Surgical glue will flake off in 7-10 days; the edges of steri-strips may come up but leave them in place until your follow up appointment  May shower following surgery. It is okay to get soap and water on all wounds when showering. Do not soak wounds under water (bath, pool, hot tub) until healed about three weeks after surgery. Activity:  No lifting more than 20 lbs for 3-4 weeks. No repetitive abdominal straining (sit-ups, push-ups, crunches, pull-ups, squats), for 3 weeks. Okay to perform normal activities of daily living and walk up stairs. Walk daily. Continue deep breathing and use incentive spirometer at home. Return to school or work when you fee comfortable. Typically 1-2 weeks for a desk job or up to 4 weeks for a manual labor job. You may resume driving when you have minimal pain and are not taking narcotic pain medication. Medications:   You will have been prescribed the following medications prior to discharge:  Percocet (5mg): take one pill by mouth every 4 hours as needed for pain  Zofran (8mg): take one pill by mouth every 8 hours for nausea or vomiting  Omeprazole (40mg): take one pill by mouth daily for 90 days  Carafate: dissolve tablet in small amount of water, drink as a slurry 4 times daily  Use an over the counter stool softener (Miralax) or laxative (Ducolax, milk of magnesium) if you feel constipated. You may not have a normal bowel movement being on a liquid diet. Do NOT take any NSAID medication (Ibuprofen, Aleve, Motrin, Naproxen, Celebrex, etc) after surgery  No nicotine in any form (cigarettes, vape, marijuana, chew, gum, patches)  It is acceptable to space multiple medications throughout the day as needed  Oral medications should be crushed if they are large; acceptable to take whole pills if they are small  You may resume home medications unless instructed otherwise    Notify your surgeon if. .. Fever of 101.5 degrees F or 38 degrees C (by mouth)  Shortness of breath or difficulty breathing  Chest pain or very fast heart rate  Significant leg swelling and/or pain  Redness, swelling, foul-smelling drainage, bleeding or heat around your incisions  Persistent vomiting and/or inability to keep fluids down, lightheadedness  Significant abdominal bloating, swelling or pain          Gastric Sleeve Surgery: What to Expect at Home  Your Recovery  Sleeve gastrectomy is surgery to remove part of the stomach to help with weight loss. The surgery, which is also called gastric sleeve surgery, limits the amount of food your stomach can hold. The cut (incision) the doctor made in your belly will probably be sore for several weeks after the surgery. If you have stitches, the doctor will take these out at your follow-up visit. Because the surgery makes your stomach smaller, you will get full more quickly when you eat. You probably will lose weight very quickly in the first few months after surgery. As time goes on, your weight loss will slow down. You can expect most of your weight loss to happen in the first 12 months after your surgery. You will have regular doctor's appointments during this time to check how you are doing. It's important to think of this surgery as a tool to help you lose weight. It's not an instant fix.  You will still need to eat a healthy diet and get regular exercise. This will help you reach your weight goal and avoid regaining the weight you lose. It's common to have many different emotions after this surgery. You may feel happy or excited as you begin to lose weight. But you may also feel overwhelmed or frustrated by the changes that you have to make in your diet, activity, and lifestyle. Talk with your doctor if you have concerns or questions. This care sheet gives you a general idea about how long it will take for you to recover. But each person recovers at a different pace. Follow the steps below to get better as quickly as possible. How can you care for yourself at home? Activity    Rest when you feel tired. Getting enough sleep will help you recover. Try to walk each day. Start out by walking a little more than you did the day before. Bit by bit, increase the amount you walk. Walking boosts blood flow and helps prevent pneumonia and constipation. Avoid lifting anything that would make you strain. This may include heavy grocery bags and milk containers, a heavy briefcase or backpack, cat litter or dog food bags, a vacuum , or a child. Avoid strenuous activities, such as bicycle riding, jogging, weight lifting, or aerobic exercise, until your doctor says it is okay. Do not take part in any activity where you could be hit in the belly. This could be sports or playing with children. Hold a pillow over your incision when you cough or take deep breaths. This will support your belly and decrease your pain. Do breathing exercises at home as instructed by your doctor. This will help prevent pneumonia. You can shower, if your doctor okays it. Pat the incision dry. Do not take a bath for the first 2 weeks, or until your doctor tells you it is okay. Ask your doctor when you can drive again. You will probably need to take 2 to 4 weeks off from work. It depends on the type of work you do and how you feel.      Ask your doctor when it is okay for you to have sex. Diet    Your doctor will give you specific instructions about what to eat after the surgery. For the first 2 to 6 weeks, you will need to follow a liquid or soft diet. Bit by bit, you will be able to add solid foods back into your diet. Your doctor may recommend that you work with a dietitian to plan healthy meals that give you enough protein, vitamins, and minerals while you are losing weight. Even with a healthy diet, you probably will need to take vitamin and mineral supplements for the rest of your life. Sometimes the stomach empties food into the small intestine too quickly. This is called dumping syndrome. It can cause diarrhea and make you feel faint, shaky, and nauseated. It also can make it hard for your body to get enough nutrition. Avoid high-sugar foods--such as desserts, soda pop, and fruit juices-- which are most likely to cause dumping syndrome. Do not drink liquids within a half hour before eating and up to an hour after eating. Liquids move food even more quickly into the small intestine. Quick emptying of the stomach increases the chance of diarrhea. Eat slowly. Try to chew each bite about 20 times. Allow 20 to 30 minutes for each meal.  Eat 5 or 6 small meals or snacks a day. This may keep you from feeling too full after eating and may reduce problems with diarrhea and dumping syndrome. Check with your doctor before drinking alcohol. Your body may absorb alcohol more quickly after surgery. You may notice that your bowel movements are not regular right after your surgery. This is common. Try to avoid constipation and straining with bowel movements. Your doctor may suggest fiber, a stool softener, or a mild laxative. Medicines    Your doctor will tell you if and when you can restart your medicines. You will also be given instructions about taking any new medicines.      If you stopped taking aspirin or some other blood thinner, your doctor will tell you when to start taking it again. Take pain medicines exactly as directed. If the doctor gave you a prescription medicine for pain, take it as prescribed. If you are not taking a prescription pain medicine, ask your doctor if you can take an over-the-counter medicine. Do not take two or more pain medicines at the same time unless the doctor told you to. Many pain medicines contain acetaminophen, which is Tylenol. Too much acetaminophen (Tylenol) can be harmful. If you think your pain medicine is making you sick to your stomach: Take your medicine after meals (unless your doctor has told you not to). Ask your doctor for a different pain medicine. If your doctor prescribed antibiotics, take them as directed. Do not stop taking them just because you feel better. You need to take the full course of antibiotics. Incision care    If you have strips of tape on the incision, leave the tape on for a week or until it falls off. Wash the area daily with warm, soapy water and pat it dry. Don't use hydrogen peroxide or alcohol, which can slow healing. You may cover the area with a gauze bandage if it weeps or rubs against clothing. Change the bandage every day. Keep the area clean and dry. Follow-up care is a key part of your treatment and safety. Be sure to make and go to all appointments, and call your doctor if you are having problems. It's also a good idea to know your test results and keep a list of the medicines you take. When should you call for help? Call 911 anytime you think you may need emergency care. For example, call if:    You passed out (lost consciousness). You are short of breath. Call your doctor now or seek immediate medical care if:    You have pain that does not get better after you take pain medicine. You cannot pass stool or gas. You are sick to your stomach and cannot drink fluids. You have loose stitches, or your incision comes open.      You have signs of a blood clot, such as:  Pain in your calf, back of the knee, thigh, or groin. Redness and swelling in your leg or groin. You have signs of infection, such as: Increased pain, swelling, warmth, or redness. Red streaks leading from the incision. Pus draining from the incision. A fever. Watch closely for changes in your health, and be sure to contact your doctor if you have any problems. Where can you learn more? Go to https://[email or web address]. org and sign in to your [employer] account. Enter [phone] in the [facility] box to learn more about \"Gastric Sleeve Surgery: What to Expect at Home. \"     If you do not have an account, please click on the \"Sign Up Now\" link. Current as of: December 27, 2021               Content Version: 13.4  © 2006-2022 Healthwise, Incorporated. Care instructions adapted under license by [facility] [address] If you have questions about a medical condition or this instruction, always ask your healthcare professional. [place] [address] any warranty or liability for your use of this information.

## 2022-10-10 NOTE — PERIOP NOTE
TRANSFER - OUT REPORT:    Verbal report given to [facility] RN on Dorothy Dakins  being transferred to [facility] for routine post-op       Report consisted of patients Situation, Background, Assessment and   Recommendations(SBAR). Information from the following report(s) Surgery Report was reviewed with the receiving nurse. Opportunity for questions and clarification was provided.       Patient transported with:   [facility]

## 2022-10-10 NOTE — CARE COORDINATION
Chart screen completed. Pt is a 55 y/o female admitted for a gastrectomy sleeve. Pt is independent at baseline. Mother is supportive. Pt is listed as self pay and has a PCP. No discharge planning needs anticipated.

## 2022-10-10 NOTE — ANESTHESIA POSTPROCEDURE EVALUATION
Department of Anesthesiology  Postprocedure Note    Patient: Oziel Shook  MRN: [id number]  YOB: 1970  Date of evaluation: 10/10/2022      Procedure Summary     Date: 10/10/22 Room / Location: [facility] MAIN OR 07 / [facility] MAIN OR    Anesthesia Start: 1205 Anesthesia Stop: 7032    Procedures:       ERAS/ GASTRECTOMY SLEEVE LAPAROSCOPIC ROBOTIC (Abdomen)      EGD ESOPHAGOGASTRODUODENOSCOPY Diagnosis:       Morbid obesity ([address]      (Morbid obesity ([address] [E66.01])    Surgeons: Oseas Silverio MD Responsible Provider: May Yeboah MD    Anesthesia Type: general ASA Status: 3          Anesthesia Type: No value filed.     Gene Phase I: Gene Score: 9    Gene Phase II:        Anesthesia Post Evaluation    Patient location during evaluation: PACU  Patient participation: complete - patient participated  Level of consciousness: awake and alert  Airway patency: patent  Nausea & Vomiting: no nausea and no vomiting  Complications: no  Cardiovascular status: hemodynamically stable  Respiratory status: acceptable, nonlabored ventilation and spontaneous ventilation  Hydration status: euvolemic  Comments: BP (!) 110/51   Pulse 53   Temp 97.7 °F (36.5 °C) (Oral)   Resp 17   Ht 5' 7\" (1.702 m)   Wt 291 lb 4.8 oz (132.1 kg)   SpO2 98%   BMI 45.62 kg/m²     Multimodal analgesia pain management approach

## 2022-10-10 NOTE — ANESTHESIA PRE PROCEDURE
Department of Anesthesiology  Preprocedure Note       Name:  Clayton Carrillo   Age:  54 y.o.  :  1970                                          MRN:  [id number]         Date:  10/10/2022      Surgeon: Amelia Montalvo):  Nadege Hammonds MD    Procedure: Procedure(s):  ERAS/ GASTRECTOMY SLEEVE LAPAROSCOPIC ROBOTIC  EGD ESOPHAGOGASTRODUODENOSCOPY    Medications prior to admission:   Prior to Admission medications    Medication Sig Start Date End Date Taking? Authorizing Provider   Multiple Vitamins-Minerals (BARIATRIC FUSION) CHEW Take by mouth daily    Historical Provider, MD   sucralfate (CARAFATE) 1 GM tablet Take 1 tablet by mouth 4 times daily for 14 days Dissolve in water and drink.  22  KESHA Krause   omeprazole (PRILOSEC) 40 MG delayed release capsule Take 1 capsule by mouth every morning (before breakfast)  Patient not taking: Reported on 10/10/2022 9/13/22   KESHA Tabares   ondansetron (ZOFRAN) 4 MG tablet Take 1 tablet by mouth 3 times daily as needed for Nausea or Vomiting  Patient not taking: Reported on 10/10/2022 9/13/22   KESHA Krause   predniSONE 10 MG (21) TBPK See administration instruction per 10 mg dose pack 22   KESHA Resendez   ascorbic acid (VITAMIN C) 100 MG tablet by Oral/Gastric Tube route    Historical Provider, MD   Calcium Carbonate-Vitamin D (OYSTER SHELL CALCIUM/D) 500-200 MG-UNIT TABS by Oral/Gastric Tube route    Historical Provider, MD   Magnesium Gluconate 550 MG TABS Take by mouth    Historical Provider, MD   potassium gluconate 550 mg tablet Take by mouth    Historical Provider, MD   zonisamide (ZONEGRAN) 50 MG capsule Take 50 mg by mouth as needed 10/6/21   Historical Provider, MD   B Complex Vitamins (VITAMIN B COMPLEX PO) Take 1 tablet by mouth daily    Historical Provider, MD   MAGNESIUM-POTASSIUM PO Take by mouth    Historical Provider, MD   diclofenac (VOLTAREN) 75 MG EC tablet Take 1 tablet by mouth in the morning and 1 tablet before bedtime. Do all this for 14 days. Take 1 tablet BID for 2 weeks as needed for pain. 7/21/22 8/4/22  KESHA Almodovar   calcium citrate (CALCITRATE) 950 (200 Ca) MG tablet Take by mouth daily    Ar Automatic Reconciliation   carbonyl iron (FEOSOL) 45 MG TABS Take 1 tablet by mouth daily    Ar Automatic Reconciliation   vitamin D3 (CHOLECALCIFEROL) 125 MCG (5000 UT) TABS tablet Take 5,000 Units by mouth daily    Ar Automatic Reconciliation       Current medications:    Current Facility-Administered Medications   Medication Dose Route Frequency Provider Last Rate Last Admin    lactated ringers infusion   IntraVENous Continuous [place] Ash 125 mL/hr at 10/10/22 1100 New Bag at 10/10/22 1100    acetaminophen (TYLENOL) tablet 1,000 mg  1,000 mg Oral Once Theoplis Liming, MD        scopolamine (TRANSDERM-SCOP) transdermal patch 1 patch  1 patch TransDERmal Once Theoplis Liming, MD   1 patch at 10/10/22 1045    midazolam PF (VERSED) injection 2 mg  2 mg IntraVENous Once Theoplis Liming, MD        ceFAZolin (ANCEF) 3000 mg in sterile water 30 mL IV syringe  3,000 mg IntraVENous Once Radha Veliz MD           Allergies:     Allergies   Allergen Reactions    Adhesive Tape Itching and Rash    Gluten Diarrhea, Dizziness or Vertigo, Palpitations and Swelling       Problem List:    Patient Active Problem List   Diagnosis Code    DDD (degenerative disc disease), lumbar M51.36    Lumbar radiculopathy M54.16    Morbid obesity ([facility] [address] E66.01    Sleep apnea G47.30    Failed back surgical syndrome M96.1    History of Lyme disease Z86.19       Past Medical History:        Diagnosis Date    Celiac disease     Chronic pain of left knee     COVID-19 03/2022    omicron variant    COVID-19 03/2021    hospitalized for 3 days    DDD (degenerative disc disease), lumbar     Endometriosis     Failed back surgical syndrome     Postsurgical changes of prior discectomy at L5/S1.    Headache     History of gastroesophageal reflux (GERD)     Lumbosacral radiculopathy at S1     Lyme disease     Migraines     GLO (obstructive sleep apnea)     in process of getting CPAP    PONV (postoperative nausea and vomiting)     Prolonged emergence from general anesthesia     Vertigo     off and on \"constantly\"       Past Surgical History:        Procedure Laterality Date    BACK SURGERY  01/31/2018    shaved down herniated disc; untangled nerve    BRACHIOPLASTY Bilateral     CHOLECYSTECTOMY  1996    SALPINGO-OOPHORECTOMY Right     TUMOR REMOVAL Left     from hand and ring finer       Social History:    Social History     Tobacco Use    Smoking status: Never     Passive exposure: Past (Mom smoked for 6 mos when pt was a child)    Smokeless tobacco: Never   Substance Use Topics    Alcohol use: Never                                Counseling given: Not Answered      Vital Signs (Current):   Vitals:    10/06/22 0922 10/10/22 1015   BP:  (!) 152/93   Pulse:  60   Resp:  18   Temp:  98.2 °F (36.8 °C)   TempSrc:  Temporal   SpO2:  100%   Weight: 291 lb (132 kg) 291 lb 4.8 oz (132.1 kg)   Height: 5' 7\" (1.702 m)                                               BP Readings from Last 3 Encounters:   10/10/22 (!) 152/93   09/26/22 (!) 151/76   09/13/22 114/65       NPO Status: Time of last liquid consumption: 2200                        Time of last solid consumption: 2200                        Date of last liquid consumption: 10/09/22                        Date of last solid food consumption: 10/09/22    BMI:   Wt Readings from Last 3 Encounters:   10/10/22 291 lb 4.8 oz (132.1 kg)   09/26/22 291 lb 6.4 oz (132.2 kg)   09/13/22 294 lb (133.4 kg)     Body mass index is 45.62 kg/m².     CBC:   Lab Results   Component Value Date/Time    WBC 6.7 09/26/2022 02:09 PM    RBC 4.33 09/26/2022 02:09 PM    HGB 13.2 09/26/2022 02:09 PM    HCT 40.3 09/26/2022 02:09 PM    MCV 93.1 09/26/2022 02:09 PM    RDW 12.8 09/26/2022 02:09 PM     09/26/2022 02:09 PM       CMP:   Lab Results   Component Value Date/Time     09/26/2022 02:09 PM    K 3.7 09/26/2022 02:09 PM     09/26/2022 02:09 PM    CO2 29 09/26/2022 02:09 PM    BUN 13 09/26/2022 02:09 PM    CREATININE 0.65 09/26/2022 02:09 PM    GFRAA >60 09/26/2022 02:09 PM    LABGLOM >60 09/26/2022 02:09 PM    GLUCOSE 99 09/26/2022 02:09 PM    CALCIUM 9.3 09/26/2022 02:09 PM       POC Tests: No results for input(s): POCGLU, POCNA, POCK, POCCL, POCBUN, POCHEMO, POCHCT in the last 72 hours. Coags: No results found for: PROTIME, INR, APTT    HCG (If Applicable):   Lab Results   Component Value Date    PREGTESTUR Negative 10/10/2022        ABGs: No results found for: PHART, PO2ART, CJG7RWT, NOU9XMH, BEART, [id number]     Type & Screen (If Applicable):  No results found for: LABABO, LABRH    Drug/Infectious Status (If Applicable):  No results found for: HIV, HEPCAB    COVID-19 Screening (If Applicable): No results found for: COVID19        Anesthesia Evaluation  Patient summary reviewed and Nursing notes reviewed   history of anesthetic complications: PONV. Airway: Mallampati: II  TM distance: >3 FB   Neck ROM: full  Mouth opening: > = 3 FB   Dental: normal exam         Pulmonary:normal exam    (+) sleep apnea (Recently started using CPAP): on CPAP,      (-) not a current smoker                           Cardiovascular:  Exercise tolerance: good (>4 METS),           Rhythm: regular  Rate: normal                    Neuro/Psych:   (+) headaches: migraine headaches,              ROS comment: Lumbar radicular pain GI/Hepatic/Renal:   (+) morbid obesity          Endo/Other: Negative Endo/Other ROS                    Abdominal:             Vascular: Other Findings:           Anesthesia Plan      general     ASA 3       Induction: intravenous. MIPS: Postoperative opioids intended and Prophylactic antiemetics administered.   Anesthetic plan and risks discussed with patient. Use of blood products discussed with patient whom consented to blood products.                      Yury Simsm MD   10/10/2022

## 2022-10-11 VITALS
DIASTOLIC BLOOD PRESSURE: 65 MMHG | WEIGHT: 291.3 LBS | HEIGHT: 67 IN | RESPIRATION RATE: 18 BRPM | BODY MASS INDEX: 45.72 KG/M2 | OXYGEN SATURATION: 96 % | SYSTOLIC BLOOD PRESSURE: 127 MMHG | TEMPERATURE: 98.1 F | HEART RATE: 61 BPM

## 2022-10-11 LAB
ANION GAP SERPL CALC-SCNC: 5 MMOL/L (ref 2–11)
BASOPHILS # BLD: 0 K/UL (ref 0–0.2)
BASOPHILS NFR BLD: 0 % (ref 0–2)
BUN SERPL-MCNC: 8 MG/DL (ref 6–23)
CALCIUM SERPL-MCNC: 8.5 MG/DL (ref 8.3–10.4)
CHLORIDE SERPL-SCNC: 109 MMOL/L (ref 101–110)
CO2 SERPL-SCNC: 27 MMOL/L (ref 21–32)
CREAT SERPL-MCNC: 0.52 MG/DL (ref 0.6–1)
DIFFERENTIAL METHOD BLD: ABNORMAL
EOSINOPHIL # BLD: 0 K/UL (ref 0–0.8)
EOSINOPHIL NFR BLD: 0 % (ref 0.5–7.8)
ERYTHROCYTE [DISTWIDTH] IN BLOOD BY AUTOMATED COUNT: 13.2 % (ref 11.9–14.6)
GLUCOSE SERPL-MCNC: 90 MG/DL (ref 65–100)
HCT VFR BLD AUTO: 33.8 % (ref 35.8–46.3)
HGB BLD-MCNC: 11 G/DL (ref 11.7–15.4)
IMM GRANULOCYTES # BLD AUTO: 0 K/UL (ref 0–0.5)
IMM GRANULOCYTES NFR BLD AUTO: 0 % (ref 0–5)
LYMPHOCYTES # BLD: 1.9 K/UL (ref 0.5–4.6)
LYMPHOCYTES NFR BLD: 23 % (ref 13–44)
MCH RBC QN AUTO: 30.4 PG (ref 26.1–32.9)
MCHC RBC AUTO-ENTMCNC: 32.5 G/DL (ref 31.4–35)
MCV RBC AUTO: 93.4 FL (ref 82–102)
MONOCYTES # BLD: 0.6 K/UL (ref 0.1–1.3)
MONOCYTES NFR BLD: 8 % (ref 4–12)
NEUTS SEG # BLD: 5.5 K/UL (ref 1.7–8.2)
NEUTS SEG NFR BLD: 68 % (ref 43–78)
NRBC # BLD: 0 K/UL (ref 0–0.2)
PLATELET # BLD AUTO: 217 K/UL (ref 150–450)
PMV BLD AUTO: 10.5 FL (ref 9.4–12.3)
POTASSIUM SERPL-SCNC: 4 MMOL/L (ref 3.5–5.1)
RBC # BLD AUTO: 3.62 M/UL (ref 4.05–5.2)
SODIUM SERPL-SCNC: 141 MMOL/L (ref 133–143)
WBC # BLD AUTO: 8.1 K/UL (ref 4.3–11.1)

## 2022-10-11 PROCEDURE — 80048 BASIC METABOLIC PNL TOTAL CA: CPT

## 2022-10-11 PROCEDURE — 97530 THERAPEUTIC ACTIVITIES: CPT

## 2022-10-11 PROCEDURE — C9113 INJ PANTOPRAZOLE SODIUM, VIA: HCPCS | Performed by: PHYSICIAN ASSISTANT

## 2022-10-11 PROCEDURE — APPSS15 APP SPLIT SHARED TIME 0-15 MINUTES: Performed by: PHYSICIAN ASSISTANT

## 2022-10-11 PROCEDURE — 97161 PT EVAL LOW COMPLEX 20 MIN: CPT

## 2022-10-11 PROCEDURE — 6370000000 HC RX 637 (ALT 250 FOR IP): Performed by: PHYSICIAN ASSISTANT

## 2022-10-11 PROCEDURE — 2580000003 HC RX 258: Performed by: PHYSICIAN ASSISTANT

## 2022-10-11 PROCEDURE — 85025 COMPLETE CBC W/AUTO DIFF WBC: CPT

## 2022-10-11 PROCEDURE — 6360000002 HC RX W HCPCS: Performed by: PHYSICIAN ASSISTANT

## 2022-10-11 PROCEDURE — 36415 COLL VENOUS BLD VENIPUNCTURE: CPT

## 2022-10-11 RX ADMIN — POTASSIUM CHLORIDE AND SODIUM CHLORIDE: 900; 150 INJECTION, SOLUTION INTRAVENOUS at 03:27

## 2022-10-11 RX ADMIN — SUCRALFATE 1 G: 1 TABLET ORAL at 05:45

## 2022-10-11 RX ADMIN — ACETAMINOPHEN 1000 MG: 500 TABLET, FILM COATED ORAL at 12:05

## 2022-10-11 RX ADMIN — ACETAMINOPHEN 1000 MG: 500 TABLET, FILM COATED ORAL at 05:45

## 2022-10-11 RX ADMIN — SODIUM CHLORIDE, PRESERVATIVE FREE 40 MG: 5 INJECTION INTRAVENOUS at 09:02

## 2022-10-11 RX ADMIN — HEPARIN SODIUM 5000 UNITS: 5000 INJECTION INTRAVENOUS; SUBCUTANEOUS at 05:45

## 2022-10-11 RX ADMIN — SUCRALFATE 1 G: 1 TABLET ORAL at 12:05

## 2022-10-11 RX ADMIN — GABAPENTIN 300 MG: 300 CAPSULE ORAL at 09:01

## 2022-10-11 RX ADMIN — KETOROLAC TROMETHAMINE 30 MG: 30 INJECTION, SOLUTION INTRAMUSCULAR at 03:27

## 2022-10-11 NOTE — PROGRESS NOTES
Bariatric Surgery Daily Progress Note    Patient: Kamran Streeter  MRN: [id number]  Date: 10/11/2022 8:30 AM  Admit Date: 10/10/2022  Procedure: robotic assisted sleeve gastrectomy    Subjective:     Kamran Streeter is a 54 y.o. female who is POD #1 s/p sleeve gastrectomy completed by Dr. Dilip Cohen. She reports that she is doing well and feeling better than yesterday. She admits to mild pain at the incision sites, but denies nausea, vomiting or dry heaving. She has been OOB ambulating the halls, voiding without difficulty, using the incentive spirometer and tolerating adequate PO intake including protein shakes. She admits that she normally has some vision changes, but she feels that her eyes are slightly different today. Lidocaine infusion stopped during our conversation.      Review of Systems   General ROS: negative for - fever or chills  Psychological ROS: negative for - memory difficulties  Ophthalmic ROS: negative for - blurry vision or double vision  Respiratory ROS: negative for - cough, shortness of breath, or wheezing  Cardiovascular ROS: negative for - chest pain  Gastrointestinal ROS: negative for - nausea, vomiting or dry heaving  Musculoskeletal ROS: negative for - pain in the lower extremities  Neurological ROS: negative for - dizziness, light headedness, headaches or numbness     Objective:     MEDS:    Current Facility-Administered Medications   Medication Dose Route Frequency Provider Last Rate Last Admin    acetaminophen (TYLENOL) tablet 1,000 mg  1,000 mg Oral Q6H KESHA Ansari   1,000 mg at 10/11/22 0545    diphenhydrAMINE (BENADRYL) capsule 25 mg  25 mg Oral Q6H PRN Dubose KESHA Pittman        Or    diphenhydrAMINE (BENADRYL) injection 25 mg  25 mg IntraVENous Q6H PRN Dubose KESHA Pittman        pantoprazole (PROTONIX) 40 mg in sodium chloride (PF) 10 mL injection  40 mg IntraVENous Daily KESHA Ansari        0.9% NaCl with KCl 20 mEq infusion IntraVENous Continuous KESHA Mancilla 125 mL/hr at 10/11/22 0327 New Bag at 10/11/22 0327    sodium chloride flush 0.9 % injection 5-40 mL  5-40 mL IntraVENous 2 times per day KESHA Mancilla   10 mL at 10/10/22 2123    sodium chloride flush 0.9 % injection 5-40 mL  5-40 mL IntraVENous PRN KESHA Smith        0.9 % sodium chloride infusion   IntraVENous PRN KESHA Mancilla        oxyCODONE (ROXICODONE) immediate release tablet 5 mg  5 mg Oral Q4H PRN KESHA Mancilla   5 mg at 10/10/22 2117    HYDROmorphone HCl PF (DILAUDID) injection 0.5 mg  0.5 mg IntraVENous Q3H PRN KESHA Mancilla        ondansetron (ZOFRAN) injection 4 mg  4 mg IntraVENous Q6H PRN KESHA Mancilla   4 mg at 10/10/22 1710    prochlorperazine (COMPAZINE) injection 10 mg  10 mg IntraVENous Q6H PRN Andreaeal KESHA Kam        lidocaine 2000 mg in dextrose 5% 500 mL infusion  1 mg/kg/hr (Ideal) IntraVENous Continuous KESHA Mancilla   Stopped at 10/10/22 1546    hydrALAZINE (APRESOLINE) injection 10 mg  10 mg IntraVENous Q6H PRN Andreaeal KESHA Kam        heparin (porcine) injection 5,000 Units  5,000 Units SubCUTAneous 3 times per day KESHA Mancilla   5,000 Units at 10/11/22 0545    gabapentin (NEURONTIN) capsule 300 mg  300 mg Oral TID KESHA Mancilla   300 mg at 10/10/22 1711    simethicone (MYLICON) chewable tablet 80 mg  80 mg Oral Q6H PRN KESHA Ansari        sucralfate (CARAFATE) tablet 1 g  1 g Oral 4 times per day KESHA Mancilla   1 g at 10/11/22 0545    lactated ringers infusion   IntraVENous Continuous Kylie Rajan MD   Stopped at 10/10/22 1546    scopolamine (TRANSDERM-SCOP) transdermal patch 1 patch  1 patch TransDERmal Once Kylie Rajna MD   1 patch at 10/10/22 1045    midazolam PF (VERSED) injection 2 mg  2 mg IntraVENous Once Kylie Rajan MD        lidocaine 2000 mg in dextrose 5% 500 mL infusion  1 mg/kg/hr (Ideal) IntraVENous Continuous Raiza Samuel MD 15.4 mL/hr at 10/10/22 1546 1 mg/kg/hr at 10/10/22 1546       ALLERGIES:       Allergies   Allergen Reactions    Adhesive Tape Itching and Rash    Gluten Diarrhea, Dizziness or Vertigo, Palpitations and Swelling       I/O:      Intake/Output Summary (Last 24 hours) at 10/11/2022 0830  Last data filed at 10/11/2022 0400  Gross per 24 hour   Intake 784.64 ml   Output 1525 ml   Net -740.36 ml       Physical Examination:     /60   Pulse 77   Temp 98.8 °F (37.1 °C) (Oral)   Resp 18   Ht 5' 7\" (1.702 m)   Wt 291 lb 4.8 oz (132.1 kg)   SpO2 98%   BMI 45.62 kg/m²     General:  Alert, oriented, cooperative in no acute distress. Neuro:  Alert, oriented to person, place and time. Lungs:  Unlabored breathing. Symmetrical chest expansion. Heart: Regular rate and rhythm. Abdomen:  Soft, appropriately tender at incision sites. Lap incisions C/D/I without presence of erythema, infection, or allergic reaction. Abdominal binder in place. Extremities:  Extremities normal, atraumatic, no cyanosis or edema. Labs / Imaging / Diagnostics:     Labs: All recent labs were reviewed.    Recent Results (from the past 24 hour(s))   TYPE AND SCREEN    Collection Time: 10/10/22 10:53 AM   Result Value Ref Range    Crossmatch expiration date 10/13/2022,2359     ABO/Rh A POSITIVE     Antibody Screen NEG    POC Pregnancy Urine Qual    Collection Time: 10/10/22 10:59 AM   Result Value Ref Range    Preg Test, Ur Negative NEG     Basic Metabolic Panel    Collection Time: 10/11/22  5:16 AM   Result Value Ref Range    Sodium 141 133 - 143 mmol/L    Potassium 4.0 3.5 - 5.1 mmol/L    Chloride 109 101 - 110 mmol/L    CO2 27 21 - 32 mmol/L    Anion Gap 5 2 - 11 mmol/L    Glucose 90 65 - 100 mg/dL    BUN 8 6 - 23 MG/DL    Creatinine 0.52 (L) 0.6 - 1.0 MG/DL    Est, Glom Filt Rate >60 >60 ml/min/1.73m2    Calcium 8.5 8.3 - 10.4 MG/DL   CBC with Auto Differential    Collection Time: 10/11/22  5:16 AM   Result Value Ref Range    WBC 8.1 4.3 - 11.1 K/uL    RBC 3.62 (L) 4.05 - 5.2 M/uL    Hemoglobin 11.0 (L) 11.7 - 15.4 g/dL    Hematocrit 33.8 (L) 35.8 - 46.3 %    MCV 93.4 82.0 - 102.0 FL    MCH 30.4 26.1 - 32.9 PG    MCHC 32.5 31.4 - 35.0 g/dL    RDW 13.2 11.9 - 14.6 %    Platelets 190 775 - 599 K/uL    MPV 10.5 9.4 - 12.3 FL    nRBC 0.00 0.0 - 0.2 K/uL    Differential Type AUTOMATED      Seg Neutrophils 68 43 - 78 %    Lymphocytes 23 13 - 44 %    Monocytes 8 4.0 - 12.0 %    Eosinophils % 0 (L) 0.5 - 7.8 %    Basophils 0 0.0 - 2.0 %    Immature Granulocytes 0 0.0 - 5.0 %    Segs Absolute 5.5 1.7 - 8.2 K/UL    Absolute Lymph # 1.9 0.5 - 4.6 K/UL    Absolute Mono # 0.6 0.1 - 1.3 K/UL    Absolute Eos # 0.0 0.0 - 0.8 K/UL    Basophils Absolute 0.0 0.0 - 0.2 K/UL    Absolute Immature Granulocyte 0.0 0.0 - 0.5 K/UL       Imaging: No images were independently reviewed. Assessment / Plan: Active Hospital Problems    Diagnosis Date Noted    Morbid obesity [facility]) [E66.01] 04/27/2021       Radhames Munroe is a 54 y.o. female s/p robotic assisted sleeve gastrectomy    Pain control   2. Bariatric diet - clear liquids, Ensure Max protein  3. Lap incisions C/D/I  4. No tachycardia overnight   5. Labs reviewed. WBC 8.1. Hgb stable 11.0.  6.   OOB and ambulate as tolerated. PT consulted. 7.   DVT prophylaxis - SCDs/Heparin  8. Abdominal binder for comfort  9.    Recheck at lunch for improved vision changes    Discharge: to home likely later today    Farshad Small PA-C  Date: 10/11/2022    Counseling time:counseling time more than 50% of visit: 15 minutes: I spent this time preparing to see patient (including chart review and preparation), obtaining and/or reviewing additional medical history, performing a physical exam and evaluation, documenting clinical information in the electronic health record, independently interpreting results, communicating results to patient, family or caregiver, and/or coordinating care.

## 2022-10-11 NOTE — PROGRESS NOTES
[facility] End of Shift Note      1 Day Post-Op    Voiding: Yes    Flatus: Yes    Tolerating Clear Liquids?: Yes    Tolerating Ensure Max?: Yes    Ambulated in hallway Several times, tolerated well. Up to chair for (No meals on this shift) meals.     Lidocaine: Yes    PRN Pain Medications Used?: Yes    IS Used: Yes    Ideal body weight: 61.6 kg (135 lb 12.9 oz)    Signed By: Shabbir Mcfadden RN     October 11, 2022

## 2022-10-11 NOTE — PLAN OF CARE
Shift assessment complete. Patient is alert and oriented, in bed. Respirations are even and unlabored, patient is on room air. Bowel sounds are present, but hypoactive. Patient has 5 lap sites with glue and abdominal binder. IVF infusing per order. Patient has voided. Patient ambulated in hallway, tolerated well. No needs expressed at this time.    Bed low and locked, call light within reach.   ______________________________________________    Problem: Pain  Goal: Verbalizes/displays adequate comfort level or baseline comfort level  Outcome: Progressing

## 2022-10-11 NOTE — PROGRESS NOTES
ACUTE PHYSICAL THERAPY GOALS:   (Developed with and agreed upon by patient and/or caregiver.)  STG:  (1.)Ms. Lizett Khan will move from supine to sit and sit to supine  with SUPERVISION within 7 treatment day(s). (2.)Ms. Lizett Khan will transfer from bed to chair and chair to bed with SUPERVISION using the least restrictive device within 7 treatment day(s). (3.)Ms. Lizett Khan will ambulate with SUPERVISION for 650 feet with the least restrictive device within 7 treatment day(s). (4)Ms. Lizett Khan will perform HEP independently in 7 days. ________________________________________________________________________________________________      PHYSICAL THERAPY Initial Assessment and AM  (Link to Caseload Tracking: PT Visit Days : 1  Acknowledge Orders  Time In/Out  PT Charge Capture  Rehab Caseload Tracker    Katie Fernando is a 54 y.o. female   PRIMARY DIAGNOSIS: Morbid obesity ([address]  Morbid obesity ([address] [E66.01]  Procedure(s) (LRB):  ERAS/ GASTRECTOMY SLEEVE LAPAROSCOPIC ROBOTIC (N/A)  EGD ESOPHAGOGASTRODUODENOSCOPY (N/A)  1 Day Post-Op  Reason for Referral: Other abnormalities of gait and mobility (R26.89)  Inpatient: Payor: [facility] SELF PAY / Plan: [facility] SELF PAY COSMETIC / Product Type: *No Product type* /     ASSESSMENT:     REHAB RECOMMENDATIONS:   Recommendation to date pending progress:  Setting:  No further skilled therapy after discharge from hospital    Equipment:    None     ASSESSMENT:  Ms. Lizett Khan presents with decreased functional mobility s/p gastric sleeve. She is normally independent and uses a cane for longer distance gait due to chronic back issues with right LE weakness. She will have support from her mom. This am, she is up in the chair and feeling well. She did some exercises listed below. She had a little difficulty with right LE as it is weaker. She ambulated in the benitez using IV pole as a cane and did some steps without difficulty SBA.   She needed min assist for her right LE to get back in bed.  She plans to go home today. Will see one more time if she is here tomorrow.      [address] [facility] [address] [id number] AM-PAC 6 Clicks Basic Mobility Inpatient Short Form  AM-PAC Mobility Inpatient   How much difficulty turning over in bed?: None  How much difficulty sitting down on / standing up from a chair with arms?: None  How much difficulty moving from lying on back to sitting on side of bed?: A Little  How much help from another person moving to and from a bed to a chair?: None  How much help from another person needed to walk in hospital room?: None  How much help from another person for climbing 3-5 steps with a railing?: None  AM-PAC Inpatient Mobility Raw Score : 23  AM-PAC Inpatient T-Scale Score : 56.93  Mobility Inpatient CMS 0-100% Score: 11.2  Mobility Inpatient CMS G-Code Modifier : CI    SUBJECTIVE:   Ms. Mak Liter states, \"better than yesterday\"     Social/Functional Lives With: Parent  Type of Home: House  Home Layout: Able to Live on Main level with bedroom/bathroom  Home Access: Stairs to enter with rails  Entrance Stairs - Number of Steps: 3  Entrance Stairs - Rails: Both  Home Equipment: Cane    OBJECTIVE:     PAIN: Talib Min / O2: PRECAUTION / Izell Blazing / Munroe Tibbie:   Pre Treatment:          Post Treatment: mild pain Vitals        Oxygen      IV    RESTRICTIONS/PRECAUTIONS:  Restrictions/Precautions: Surgical Protocols                 GROSS EVALUATION: Intact Impaired (Comments):   AROM [x]     PROM []    Strength [x]  Except right LE is weaker 3-4/5   Balance []  Using IV pole with gait   Posture [] Rounded Shoulders   Sensation [x]     Coordination []      Tone []     Edema []    Activity Tolerance [] Patient Tolerated treatment well    []      COGNITION/  PERCEPTION: Intact Impaired (Comments):   Orientation [x]     Vision [x]     Hearing [x]     Cognition  [x]       MOBILITY: I Mod I S SBA CGA Min Mod Max Total  NT x2 Comments:   Bed Mobility    Rolling [] [] [] [] [] [] [] [] [] [] []    Supine to Sit [] [] [] [] [] [] [] [] [] [] [] In chair   Scooting [] [] [] [] [] [] [] [] [] [] []    Sit to Supine [] [] [] [] [] [x] [] [] [] [] []    Transfers    Sit to Stand [] [] [] [x] [] [] [] [] [] [] []    Bed to Chair [] [] [] [x] [] [] [] [] [] [] []    Stand to Sit [] [] [] [x] [] [] [] [] [] [] []     [] [] [] [] [] [] [] [] [] [] []    I=Independent, Mod I=Modified Independent, S=Supervision, SBA=Standby Assistance, CGA=Contact Guard Assistance,   Min=Minimal Assistance, Mod=Moderate Assistance, Max=Maximal Assistance, Total=Total Assistance, NT=Not Tested    GAIT: I Mod I S SBA CGA Min Mod Max Total  NT x2 Comments:   Level of Assistance [] [] [] [x] [] [] [] [] [] [] []    Distance 650 feet    DME IV pole    Gait Quality Decreased stance    Weightbearing Status Restrictions/Precautions  Restrictions/Precautions: Surgical Protocols    Stairs Stairs/Curb  Stairs?: Yes  Stairs  # Steps : 3  Rails: Bilateral  Assistance: Stand by assistance    I=Independent, Mod I=Modified Independent, S=Supervision, SBA=Standby Assistance, CGA=Contact Guard Assistance,   Min=Minimal Assistance, Mod=Moderate Assistance, Max=Maximal Assistance, Total=Total Assistance, NT=Not Tested    PLAN:   FREQUENCY AND DURATION: Daily for duration of hospital stay or until stated goals are met, whichever comes first.    THERAPY PROGNOSIS: Good    PROBLEM LIST:   (Skilled intervention is medically necessary to address:)  Decreased Activity Tolerance  Decreased Balance  Decreased Coordination  Decreased Gait Ability  Decreased Safety Awareness  Decreased Strength  Decreased Transfer Abilities  Increased Pain INTERVENTIONS PLANNED:   (Benefits and precautions of physical therapy have been discussed with the patient.)  Therapeutic Activity  Therapeutic Exercise/HEP  Gait Training  Education       TREATMENT:   EVALUATION: LOW COMPLEXITY: (Untimed Charge)    TREATMENT:   Therapeutic Activity (28 Minutes):  Therapeutic activity included Transfer Training, Ambulation on level ground, Stair Training, Sitting balance , Standing balance, and exercises to improve functional Activity tolerance, Balance, Coordination, Mobility, Strength, and ROM.     TREATMENT GRID:   Date:  10/11 Date:   Date:     Activity/Exercise Parameters Parameters Parameters   Ankle pumps 10     Long arc quad 10     Seated march 10     Elbow flexion 10     Overhead press 10                     AFTER TREATMENT PRECAUTIONS: Bed, Bed/Chair Locked, Call light within reach, Needs within reach, and RN at bedside    INTERDISCIPLINARY COLLABORATION:  RN/ PCT    EDUCATION: Education Given To: Patient  Education Provided: Role of Therapy;Home Exercise Program  Education Method: Demonstration;Verbal  Education Outcome: Verbalized understanding    TIME IN/OUT:  Time In: 0825  Time Out: 0900  Minutes: [place], PT

## 2022-10-11 NOTE — PROGRESS NOTES
Nedra Drew, MS, RD, LD  Surgical Weight Loss Dietitian  [address], [address] [facility]  [place]  Phone [phone]   Fax [phone]    Nutrition Assessment:  Anthropometrics: Ht: 5'7\", Wt: 291#, 183 %IBW, 45.62 BMI  Co-morbidities: GERD, GLO, OA, Joint Pain  Pt sitting at side of bed, states feeling well this morning. They are tolerating liquids well and have consumed few sips of first protein shake. Pt reports using IS and doing well with OOB movement. Nutrition Diagnosis:   Altered GI function R/T wt loss surgery as evidenced by s/p VSG. Morbid obesity R/T excessive energy intake as evidenced by 183 %IBW and 45.62 BMI. Intervention:   1. Pt visited by bariatric RD. Pt acknowledges the need for OOB movement. 2. RD answered pt questions and emphasized 1. Hydration 2. Protein 3. Movement. 3. Encouraged slow, small sips while upright. 4. Encouraged to begin MVI supplements. Reminded to take MVI and Calcium supplements separately. Monitoring and Evaluation:  1. Follow for tolerance of small amounts of clear, non-carbonated, no concentrated sweet clear liquids while admitted. Full Liquids when released to home. 2. Follow for weight loss per bariatric guidelines. 3. Bariatric RD to f/u as outpatient.      Nedra Drew MS, RD, LD

## 2022-10-14 ENCOUNTER — CLINICAL DOCUMENTATION (OUTPATIENT)
Dept: BEHAVIORAL/MENTAL HEALTH CLINIC | Facility: CLINIC | Age: 52
End: 2022-10-14

## 2022-10-14 NOTE — PROGRESS NOTES
Joselyn Osbornerd  10/13/22  Duration: 52 mins  Pt attended the Bariatric Support Group virtually through Zoom. This group is held at 6 pm the second Thursday of each month. Psycho-education was provided. Coping skills were discussed. The pts were encouraged to share their own thoughts and feelings. This SW facilitated the group. Guided questions were asked to encourage discussion. Consent: Joselyn Chandler, who was seen by synchronous (real-time) audio-video technology, and/or her healthcare decision maker, is aware that this patient-initiated, Telehealth encounter on 10/14/2022 is  NOT a billable service, with coverage as determined by her insurance carrier.  She is aware that she may receive a bill and has provided verbal consent to proceed:   Yes      This virtual visit was conducted: Zoom    Total Time:  46

## 2022-10-18 NOTE — PROGRESS NOTES
Melyssa Gorman PA-C  Bariatric & Advanced Laparoscopic Surgery & Endoscopy  [address] [place] [address], [address] [facility]  Deisy CookWil[facility]il Farmer  Phone [phone]   Fax [phone]    Date of visit: 10/20/2022          Name: Manisha Meyer      MRN: [id number]       : 1970       Age: 54 y.o. Sex: female        PCP: SANFORD Hoover NP     Surgeon: Dr. Tomasa Estes  Procedure: robotic assisted sleeve gastrectomy    HPI:    1.5 weeks post-op visit after a robotic assisted sleeve gastrectomy was done on 10/10/2022. She has lost 19 lbs since her last office visit. Weight History Graph  Post-Surgical Weight Loss  Date: 10/20/22  Height: 5' 7\" (170.2 cm)  Weight: 275 lb (124.7 kg)  BMI: 43.07  Weight Change: -19 lbs  Total Weight Change: -19 lbs  % EBWL: 14%    Evaluation of Pre-operative Co-morbid Conditions:    Sleep Apnea Yes, uses CPAP- Yes   GERD Yes, treatment medication- Omeprazole post op     Clinical Assessment and Physical Exam:    She is doing very well today and is feeling good. She reports that she has been adhering to the liquid diet without difficulty. She denies any nausea, vomiting or heartburn. She reports right sided abdominal pain at the incision site that is improving, and occasional epigastric pain with drinking too much. She denies fevers or chills, or any redness or drainage from the incision sites. She does not report having problems with constipation. Her pain is well controlled and is not taking pain medications. She has been taking the PPI without difficulty. She has been doing her protein shakes without difficulty. Protein:  60 grams per day  Fluids:    64 ounces per day  Exercise:  walking daily and PT exercises  Denies reflux. Denies dysphagia. Tolerating Protein first diet without difficulty.     PMH:  Past Medical History:   Diagnosis Date    Celiac disease     Chronic pain of left knee     COVID-19 2022    omicron variant COVID-19 03/2021    hospitalized for 3 days    DDD (degenerative disc disease), lumbar     Endometriosis     Failed back surgical syndrome     Postsurgical changes of prior discectomy at L5/S1. Headache     History of gastroesophageal reflux (GERD)     Lumbosacral radiculopathy at S1     Lyme disease     Migraines     GLO (obstructive sleep apnea)     in process of getting CPAP    PONV (postoperative nausea and vomiting)     Prolonged emergence from general anesthesia     Vertigo     off and on \"constantly\"       PSH:  Past Surgical History:   Procedure Laterality Date    BACK SURGERY  01/31/2018    shaved down herniated disc; untangled nerve    BRACHIOPLASTY Bilateral     CHOLECYSTECTOMY  1996    SALPINGO-OOPHORECTOMY Right     SLEEVE GASTRECTOMY N/A 10/10/2022    ERAS/ GASTRECTOMY SLEEVE LAPAROSCOPIC ROBOTIC performed by Manny Herrera MD at [address] Left     from hand and ring finer    UPPER GASTROINTESTINAL ENDOSCOPY N/A 10/10/2022    EGD ESOPHAGOGASTRODUODENOSCOPY performed by Manny Herrera MD at [address]:  Current Outpatient Medications   Medication Sig Dispense Refill    Multiple Vitamins-Minerals (BARIATRIC FUSION) CHEW Take by mouth daily      sucralfate (CARAFATE) 1 GM tablet Take 1 tablet by mouth 4 times daily for 14 days Dissolve in water and drink.  56 tablet 0    omeprazole (PRILOSEC) 40 MG delayed release capsule Take 1 capsule by mouth every morning (before breakfast) 90 capsule 0    ondansetron (ZOFRAN) 4 MG tablet Take 1 tablet by mouth 3 times daily as needed for Nausea or Vomiting 30 tablet 0    ascorbic acid (VITAMIN C) 100 MG tablet by Oral/Gastric Tube route      Calcium Carbonate-Vitamin D (OYSTER SHELL CALCIUM/D) 500-200 MG-UNIT TABS by Oral/Gastric Tube route      Magnesium Gluconate 550 MG TABS Take by mouth      potassium gluconate 550 mg tablet Take by mouth      zonisamide (ZONEGRAN) 50 MG capsule Take 50 mg by mouth as needed      B Complex Vitamins (VITAMIN B COMPLEX PO) Take 1 tablet by mouth daily      MAGNESIUM-POTASSIUM PO Take by mouth      calcium citrate (CALCITRATE) 950 (200 Ca) MG tablet Take by mouth daily      carbonyl iron (FEOSOL) 45 MG TABS Take 1 tablet by mouth daily      vitamin D3 (CHOLECALCIFEROL) 125 MCG (5000 UT) TABS tablet Take 5,000 Units by mouth daily       No current facility-administered medications for this visit. ALLERGIES:    Allergies   Allergen Reactions    Adhesive Tape Itching and Rash    Gluten Diarrhea, Dizziness or Vertigo, Palpitations and Swelling       SH:  Social History     Tobacco Use    Smoking status: Never     Passive exposure: Past (Mom smoked for 6 mos when pt was a child)    Smokeless tobacco: Never   Vaping Use    Vaping Use: Never used   Substance Use Topics    Alcohol use: Never    Drug use: Never       FH:  Family History   Problem Relation Age of Onset    Stroke Mother         TIA    Asthma Mother     Osteoarthritis Mother     Lupus Mother     Kidney Disease Mother     Seizures Mother     Headache Mother     Hypertension Mother     Heart Disease Mother     Lung Disease Mother     Hypertension Father     Diabetes Father     Hypertension Paternal Grandmother     Heart Attack Paternal Grandfather 76    Breast Cancer Neg Hx        Review of systems:  The patient has no difficulty with chest pain or shortness of breath. No fever or chills. Comprehensive 13 point review of systems was otherwise unremarkable except as noted above. Physical Exam:     BP 97/64   Pulse (!) 102   Ht 5' 7\" (1.702 m)   Wt 275 lb (124.7 kg)   BMI 43.07 kg/m²     General:  Well-developed, well-nourished, no distress. Psych:  Cooperative, good insight and judgement. Neuro:  Alert, oriented to person, place and time. Lungs:  Unlabored breathing. Symmetrical chest expansion. Heart:  Regular rate and rhythm. Abdomen:  Soft, non-tender, non-distended. No guarding or rebound.  Lap incisions are healing well. Labs: All labs reviewed today. Imaging: All imaging reviewed today. Diagnosis Orders   1. Morbid obesity ([facility] [address]        2. Obesity, Class III, BMI 40-49.9 (morbid obesity) ([facility] [address]        3. S/P laparoscopic sleeve gastrectomy        4. Dietary counseling        5. Exercise counseling            Assessment/Plan:    Marjory Olszewski is a 54 y.o. female here to follow up s/p robotic assisted sleeve gastrectomy    She is doing well without any major concerns. She is adhering to the diet and we discussed and addressed all her questions. She will advance to a pureed / smooth diet. I encouraged her to continue physical activity and aim for 300 minutes a week and include 2 strength session a week to maintain the weight loss. She will continue recommended vitamin/mineral supplementation and PPI until the prescription is complete. Return to the office in 2 weeks with myself. Zachery Lin PA-C  10/20/2022    Counseling time:counseling time more than 50% of visit: 20 minutes: I spent this time preparing to see patient (including chart review and preparation), obtaining and/or reviewing additional medical history, performing a physical exam and evaluation, documenting clinical information in the electronic health record, independently interpreting results, communicating results to patient, family or caregiver, and/or coordinating care.

## 2022-10-19 NOTE — PROGRESS NOTES
Al Merlos MS, RD, LD  Surgical Weight Loss Dietitian  [address], [address] [facility]  Arpita Cook  Phone [phone]   Fax [phone]    [facility] NUTRITION REASSESSMENT  Anthropometrics:    Ht: 57, wt: 275#, 173% IBW, 43.07 BMI  Pt has lost 19 lbs since last visit. Assessment:  Pt is 1.5 weeks s/p VSG. Pt is Doing well with full liquid diet compliance. Intake of ~60g protein/d and ~64+oz fluid/d. Pt is drinking protein shake (core power elite), water, chicken broth, veggie broth, sf popsicle, yogurt with honey. Pt is exercising via walking and PT exercises for 30-60 minutes 7x week. Pt is taking MVI supplements as recommended. Pt reports taste changes secondary to having \"black tongue\" during past week. Nutrition Diagnosis:   Altered GI function R/T wt loss surgery as evidenced by s/p VSG. Class III obesity R/T excessive energy intake as evidenced by BMI = 43.07 and 173 % IBW.  --ongoing, modified--BMI and %IBW adjusted to reflect weight loss--     Intervention:   Discussed food choices and meal ideas on pureed/smooth diet, once released by surgeon. Encouraged pt to follow mindful eating behaviors, lean protein focus followed by non-starchy vegetables as able. Encouraged pt use protein supplements throughout the day as snacks rather than as meal replacement. Encouraged continued and increased activity. Pt goal of increasing time and intensity of activity as tolerated. Monitoring and Evaluation:  Monitor for continued safe, supervised weight loss for VSG. Monitor pt for meeting protein requirements of 60-80g/d, 64+ fl oz fluids. Follow for tolerance of pureed/smooth diet. Follow for increased activity.   F/U per MD Al Merlos, MS, RD, LD

## 2022-10-20 ENCOUNTER — OFFICE VISIT (OUTPATIENT)
Dept: SURGERY | Age: 52
End: 2022-10-20

## 2022-10-20 VITALS
HEIGHT: 67 IN | HEART RATE: 102 BPM | BODY MASS INDEX: 43.16 KG/M2 | WEIGHT: 275 LBS | DIASTOLIC BLOOD PRESSURE: 64 MMHG | SYSTOLIC BLOOD PRESSURE: 97 MMHG

## 2022-10-20 DIAGNOSIS — E66.01 OBESITY, CLASS III, BMI 40-49.9 (MORBID OBESITY) (HCC): ICD-10-CM

## 2022-10-20 DIAGNOSIS — Z98.84 S/P LAPAROSCOPIC SLEEVE GASTRECTOMY: ICD-10-CM

## 2022-10-20 DIAGNOSIS — E66.01 MORBID OBESITY (HCC): Primary | ICD-10-CM

## 2022-10-20 DIAGNOSIS — Z71.82 EXERCISE COUNSELING: ICD-10-CM

## 2022-10-20 DIAGNOSIS — Z71.3 DIETARY COUNSELING: ICD-10-CM

## 2022-10-20 PROCEDURE — 99024 POSTOP FOLLOW-UP VISIT: CPT | Performed by: PHYSICIAN ASSISTANT

## 2022-10-31 NOTE — PROGRESS NOTES
Sondra Dumont PA-C  Bariatric & Advanced Laparoscopic Surgery & Endoscopy  [facility] [address], [address] [facility]  Deisy Cookjohny Farmer  Phone [phone]   Fax [phone]    Date of visit: 2022          Name: Kenton Seals      MRN: [id number]       : 1970       Age: 54 y.o. Sex: female        PCP: SANFORD Rodriges NP     Surgeon: Dr. Pura Swift  Procedure: robotic assisted sleeve gastrectomy    HPI:    3 weeks post-op visit after a robotic assisted sleeve gastrectomy was done on 10/10/2022. She has lost 5lbs since her last office visit. Weight History Graph  Post-Surgical Weight Loss  Date: 22  Height: 5' 7\" (170.2 cm)  Weight: 270 lb (122.5 kg)  BMI: 42.28  Weight Change: -5 lbs  Total Weight Change: -24 lbs  % EBWL: 18%    Evaluation of Pre-operative Co-morbid Conditions:    Sleep Apnea Yes, uses CPAP- Yes   GERD Yes, treatment medication- Omeprazole post op      Clinical Assessment and Physical Exam:    She is doing very well today and is feeling good. She reports that she has been adhering to the pureed / smooth diet without difficulty. She admits to improving right sided abdominal pain at the incision site. She denies any nausea, vomiting or heartburn. She denies fevers or chills, or any redness or drainage from the incision sites. She does not report having problems with constipation. Her pain is well controlled and is not taking pain medications. She has been taking the PPI without difficulty. She has been doing her protein shakes without difficulty. Protein:  60 grams per day  Fluids:    64+ ounces per day  Exercise:  walking daily  Denies reflux. Denies dysphagia. Tolerating Protein first diet without difficulty.     PMH:  Past Medical History:   Diagnosis Date    Celiac disease     Chronic pain of left knee     COVID-19 2022    omicron variant    COVID-2021    hospitalized for 3 days    DDD (degenerative disc disease), lumbar     Endometriosis     Failed back surgical syndrome     Postsurgical changes of prior discectomy at L5/S1. Headache     History of gastroesophageal reflux (GERD)     Lumbosacral radiculopathy at S1     Lyme disease     Migraines     GLO (obstructive sleep apnea)     in process of getting CPAP    PONV (postoperative nausea and vomiting)     Prolonged emergence from general anesthesia     Vertigo     off and on \"constantly\"       PSH:  Past Surgical History:   Procedure Laterality Date    BACK SURGERY  01/31/2018    shaved down herniated disc; untangled nerve    BRACHIOPLASTY Bilateral     CHOLECYSTECTOMY  1996    SALPINGO-OOPHORECTOMY Right     SLEEVE GASTRECTOMY N/A 10/10/2022    ERAS/ GASTRECTOMY SLEEVE LAPAROSCOPIC ROBOTIC performed by Baljit Beard MD at [address] Left     from hand and ring finer    UPPER GASTROINTESTINAL ENDOSCOPY N/A 10/10/2022    EGD ESOPHAGOGASTRODUODENOSCOPY performed by Baljit Beard MD at [address]:  Current Outpatient Medications   Medication Sig Dispense Refill    Multiple Vitamins-Minerals (BARIATRIC FUSION) CHEW Take by mouth daily      sucralfate (CARAFATE) 1 GM tablet Take 1 tablet by mouth 4 times daily for 14 days Dissolve in water and drink.  56 tablet 0    omeprazole (PRILOSEC) 40 MG delayed release capsule Take 1 capsule by mouth every morning (before breakfast) 90 capsule 0    ondansetron (ZOFRAN) 4 MG tablet Take 1 tablet by mouth 3 times daily as needed for Nausea or Vomiting 30 tablet 0    ascorbic acid (VITAMIN C) 100 MG tablet by Oral/Gastric Tube route      Calcium Carbonate-Vitamin D (OYSTER SHELL CALCIUM/D) 500-200 MG-UNIT TABS by Oral/Gastric Tube route      Magnesium Gluconate 550 MG TABS Take by mouth      potassium gluconate 550 mg tablet Take by mouth      zonisamide (ZONEGRAN) 50 MG capsule Take 50 mg by mouth as needed      B Complex Vitamins (VITAMIN B COMPLEX PO) Take 1 tablet by mouth daily      MAGNESIUM-POTASSIUM PO Take by mouth      calcium citrate (CALCITRATE) 950 (200 Ca) MG tablet Take by mouth daily      carbonyl iron (FEOSOL) 45 MG TABS Take 1 tablet by mouth daily      vitamin D3 (CHOLECALCIFEROL) 125 MCG (5000 UT) TABS tablet Take 5,000 Units by mouth daily       No current facility-administered medications for this visit. ALLERGIES:    Allergies   Allergen Reactions    Adhesive Tape Itching and Rash    Gluten Diarrhea, Dizziness or Vertigo, Palpitations and Swelling       SH:  Social History     Tobacco Use    Smoking status: Never     Passive exposure: Past (Mom smoked for 6 mos when pt was a child)    Smokeless tobacco: Never   Vaping Use    Vaping Use: Never used   Substance Use Topics    Alcohol use: Never    Drug use: Never       FH:  Family History   Problem Relation Age of Onset    Stroke Mother         TIA    Asthma Mother     Osteoarthritis Mother     Lupus Mother     Kidney Disease Mother     Seizures Mother     Headache Mother     Hypertension Mother     Heart Disease Mother     Lung Disease Mother     Hypertension Father     Diabetes Father     Hypertension Paternal Grandmother     Heart Attack Paternal Grandfather 76    Breast Cancer Neg Hx        Review of systems:  The patient has no difficulty with chest pain or shortness of breath. No fever or chills. Comprehensive 13 point review of systems was otherwise unremarkable except as noted above. Physical Exam:     /73   Pulse 95   Ht 5' 7\" (1.702 m)   Wt 270 lb (122.5 kg)   BMI 42.29 kg/m²     General:  Well-developed, well-nourished, no distress. Psych:  Cooperative, good insight and judgement. Neuro:  Alert, oriented to person, place and time. Lungs:  Unlabored breathing. Symmetrical chest expansion. Heart:  Regular rate and rhythm. Abdomen:  Soft, non-tender, non-distended. No guarding or rebound. Lap incisions are healing well. Labs: All labs reviewed today. Imaging:  All imaging reviewed today. Diagnosis Orders   1. Morbid obesity ([facility] [address]        2. Obesity, Class III, BMI 40-49.9 (morbid obesity) ([facility] [address]        3. S/P laparoscopic sleeve gastrectomy        4. Dietary counseling        5. Exercise counseling            Assessment/Plan:    Naty Guerrero is a 54 y.o. female here to follow up s/p robotic assisted sleeve gastrectomy    She is doing well without any major concerns. She is adhering to the diet and we discussed and addressed all her questions. She will advance to a soft diet. I encouraged her to continue physical activity and aim for 300 minutes a week and include 2 strength session a week to maintain the weight loss. She will continue recommended vitamin/mineral supplementation and PPI until the prescription is complete. Return to the office in 3 weeks with myself. Omayra Correa PA-C  11/2/2022    Counseling time:counseling time more than 50% of visit: 20 minutes: I spent this time preparing to see patient (including chart review and preparation), obtaining and/or reviewing additional medical history, performing a physical exam and evaluation, documenting clinical information in the electronic health record, independently interpreting results, communicating results to patient, family or caregiver, and/or coordinating care.

## 2022-11-01 NOTE — PROGRESS NOTES
Chris Rivera MS, RD, LD  Surgical Weight Loss Dietitian  [address], [address] [facility]  Arpita Cook  Phone [phone]   Fax [phone]    [facility] NUTRITION REASSESSMENT  Anthropometrics:    Ht: 57, wt: 270#, 170% IBW, 42.29 BMI  Pt has lost 5 lbs since last visit. Assessment:  Pt is 3.5 weeks s/p VSG. Pt is doing well with pureed/smooth diet compliance. Intake of ~60g protein/d and ~64oz fluid/d. Pt is waiting 20-30 minutes after eating to drink liquids. Pt is eating and drinking sweet potato, carrots, chicken, shrimp, scrambled eggs with cheese, sf popsicles, protein shakes, water. Pt is exercising via walking for 10 minutes 4x week. Pt is taking MVI and Ca supplements as recommended. Pt reports enjoying eating vegetables more than before. Nutrition Diagnosis:   Altered GI function R/T wt loss surgery as evidenced by s/p VSG. Class III obesity R/T excessive energy intake as evidenced by BMI = 42.29 and 170 % IBW.  --ongoing, modified--BMI and %IBW adjusted to reflect weight loss--     Intervention:   Discussed food choices and meal ideas on Soft diet, once released by surgeon. Encouraged pt to follow mindful eating behaviors, lean protein focus followed by non-starchy vegetables as able. Encouraged pt use protein supplements throughout the day as snacks rather than as meal replacement. Encouraged continued and increased activity. Pt goal of walking, resistance bands, and arm weights exercise for 10-20 mins 6x week. Monitoring and Evaluation:  Monitor for continued safe, supervised weight loss for VSG. Monitor pt for meeting requirements of 60-80g/d protein, 64+ fl oz fluids. Follow for tolerance of Soft diet. Follow for increased activity.   F/U per MD Ellen MS, RD, LD

## 2022-11-02 ENCOUNTER — OFFICE VISIT (OUTPATIENT)
Dept: SURGERY | Age: 52
End: 2022-11-02

## 2022-11-02 VITALS
HEART RATE: 95 BPM | SYSTOLIC BLOOD PRESSURE: 117 MMHG | BODY MASS INDEX: 42.38 KG/M2 | DIASTOLIC BLOOD PRESSURE: 73 MMHG | HEIGHT: 67 IN | WEIGHT: 270 LBS

## 2022-11-02 DIAGNOSIS — Z98.84 S/P LAPAROSCOPIC SLEEVE GASTRECTOMY: ICD-10-CM

## 2022-11-02 DIAGNOSIS — E66.01 OBESITY, CLASS III, BMI 40-49.9 (MORBID OBESITY) (HCC): ICD-10-CM

## 2022-11-02 DIAGNOSIS — E66.01 MORBID OBESITY (HCC): Primary | ICD-10-CM

## 2022-11-02 DIAGNOSIS — Z71.82 EXERCISE COUNSELING: ICD-10-CM

## 2022-11-02 DIAGNOSIS — Z71.3 DIETARY COUNSELING: ICD-10-CM

## 2022-11-02 PROCEDURE — 99024 POSTOP FOLLOW-UP VISIT: CPT | Performed by: PHYSICIAN ASSISTANT

## 2022-11-27 NOTE — PROGRESS NOTES
Rusty Patel PA-C  Bariatric & Advanced Laparoscopic Surgery & Endoscopy  [place] [address], [address] [facility]  [place]  Phone [phone]   Fax [phone]    Date of visit: 2022          Name: Andrea Cabrera      MRN: [id number]       : 1970       Age: 50 y.o. Sex: female        PCP: SANFORD Friedman NP     Surgeon: Dr. Magy Iqbal  Procedure: robotic assisted sleeve gastrectomy    HPI:    7 weeks post-op visit after a robotic assisted sleeve gastrectomy was done on 10/10/2022. She has lost 9lbs since her last office visit. Weight History Graph  Post-Surgical Weight Loss  Date: 22  Height: 5' 7\" (170.2 cm)  Weight: 261 lb (118.4 kg)  BMI: 40.87  Weight Change: -9 lbs  Total Weight Change: -33 lbs  % EBWL: 24%    Evaluation of Pre-operative Co-morbid Conditions:    Sleep Apnea Yes, uses CPAP- Yes   GERD Yes, treatment medication- Omeprazole post op      Clinical Assessment and Physical Exam:    She is doing very well today and is feeling good. She reports that she has been adhering to the soft diet without difficulty. She denies any abdominal pain, nausea or vomiting or heartburn. She denies fevers or chills, or any redness or drainage from the incision sites. She does report having problems with constipation which was her normal before surgery, and continues to take Colace. Her pain is well controlled and is not taking pain medications. She has been taking the PPI without difficulty. She has been doing her protein shakes without difficulty. Protein:  60+ grams per day  Fluids:    60+ ounces per day  Exercise:  exercising 2-3x per week for 30-60 minutes  Denies reflux. Denies dysphagia. Tolerating Protein first diet without difficulty.     PMH:  Past Medical History:   Diagnosis Date    Celiac disease     Chronic pain of left knee     COVID-19 2022    omicron variant    COVID-2021    hospitalized for 3 days    DDD (degenerative disc disease), lumbar     Endometriosis     Failed back surgical syndrome     Postsurgical changes of prior discectomy at L5/S1. Headache     History of gastroesophageal reflux (GERD)     Lumbosacral radiculopathy at S1     Lyme disease     Migraines     GLO (obstructive sleep apnea)     in process of getting CPAP    PONV (postoperative nausea and vomiting)     Prolonged emergence from general anesthesia     Vertigo     off and on \"constantly\"       PSH:  Past Surgical History:   Procedure Laterality Date    BACK SURGERY  01/31/2018    shaved down herniated disc; untangled nerve    BRACHIOPLASTY Bilateral     CHOLECYSTECTOMY  1996    SALPINGO-OOPHORECTOMY Right     SLEEVE GASTRECTOMY N/A 10/10/2022    ERAS/ GASTRECTOMY SLEEVE LAPAROSCOPIC ROBOTIC performed by Allyson Campbell MD at [address] Left     from hand and ring finer    UPPER GASTROINTESTINAL ENDOSCOPY N/A 10/10/2022    EGD ESOPHAGOGASTRODUODENOSCOPY performed by Allyson Campbell MD at [address]:  Current Outpatient Medications   Medication Sig Dispense Refill    Multiple Vitamins-Minerals (BARIATRIC FUSION) CHEW Take by mouth daily      sucralfate (CARAFATE) 1 GM tablet Take 1 tablet by mouth 4 times daily for 14 days Dissolve in water and drink.  56 tablet 0    omeprazole (PRILOSEC) 40 MG delayed release capsule Take 1 capsule by mouth every morning (before breakfast) 90 capsule 0    ondansetron (ZOFRAN) 4 MG tablet Take 1 tablet by mouth 3 times daily as needed for Nausea or Vomiting 30 tablet 0    ascorbic acid (VITAMIN C) 100 MG tablet by Oral/Gastric Tube route      Calcium Carbonate-Vitamin D (OYSTER SHELL CALCIUM/D) 500-200 MG-UNIT TABS by Oral/Gastric Tube route      Magnesium Gluconate 550 MG TABS Take by mouth      potassium gluconate 550 mg tablet Take by mouth      zonisamide (ZONEGRAN) 50 MG capsule Take 50 mg by mouth as needed      B Complex Vitamins (VITAMIN B COMPLEX PO) Take 1 tablet by mouth daily      MAGNESIUM-POTASSIUM PO Take by mouth      calcium citrate (CALCITRATE) 950 (200 Ca) MG tablet Take by mouth daily      carbonyl iron (FEOSOL) 45 MG TABS Take 1 tablet by mouth daily      vitamin D3 (CHOLECALCIFEROL) 125 MCG (5000 UT) TABS tablet Take 5,000 Units by mouth daily       No current facility-administered medications for this visit. ALLERGIES:    Allergies   Allergen Reactions    Adhesive Tape Itching and Rash    Gluten Diarrhea, Dizziness or Vertigo, Palpitations and Swelling       SH:  Social History     Tobacco Use    Smoking status: Never     Passive exposure: Past (Mom smoked for 6 mos when pt was a child)    Smokeless tobacco: Never   Vaping Use    Vaping Use: Never used   Substance Use Topics    Alcohol use: Never    Drug use: Never       FH:  Family History   Problem Relation Age of Onset    Stroke Mother         TIA    Asthma Mother     Osteoarthritis Mother     Lupus Mother     Kidney Disease Mother     Seizures Mother     Headache Mother     Hypertension Mother     Heart Disease Mother     Lung Disease Mother     Hypertension Father     Diabetes Father     Hypertension Paternal Grandmother     Heart Attack Paternal Grandfather 76    Breast Cancer Neg Hx        Review of systems:  The patient has no difficulty with chest pain or shortness of breath. No fever or chills. Comprehensive 13 point review of systems was otherwise unremarkable except as noted above. Physical Exam:     /64   Pulse 77   Ht 5' 7\" (1.702 m)   Wt 261 lb (118.4 kg)   BMI 40.88 kg/m²     General:  Well-developed, well-nourished, no distress. Psych:  Cooperative, good insight and judgement. Neuro:  Alert, oriented to person, place and time. Lungs:  Unlabored breathing. Symmetrical chest expansion. Heart:  Regular rate and rhythm. Abdomen:  Soft, non-tender, non-distended. No guarding or rebound. Lap incisions are healing well. Labs:   All labs reviewed today.    Imaging: All imaging reviewed today. Diagnosis Orders   1. Morbid obesity ([facility] [address]        2. Obesity, Class III, BMI 40-49.9 (morbid obesity) ([facility] [address]        3. S/P laparoscopic sleeve gastrectomy        4. Dietary counseling        5. Exercise counseling        6. Constipation, unspecified constipation type            Assessment/Plan:    Danay Stanley is a 50 y.o. female here to follow up s/p robotic assisted sleeve gastrectomy    She is doing well without any major concerns. She is adhering to the diet and we discussed and addressed all her questions. She will advance to a regular diet. I encouraged her to continue physical activity and aim for 300 minutes a week and include 2 strength session a week to maintain the weight loss. She will continue recommended vitamin/mineral supplementation and PPI until the prescription is complete. Return to the office in 6 weeks with Dr. Nelson Segovia. Jennifer Pan PA-C  12/1/2022    Counseling time:counseling time more than 50% of visit: 30 minutes: I spent this time preparing to see patient (including chart review and preparation), obtaining and/or reviewing additional medical history, performing a physical exam and evaluation, documenting clinical information in the electronic health record, independently interpreting results, communicating results to patient, family or caregiver, and/or coordinating care.

## 2022-11-30 NOTE — PROGRESS NOTES
Iris Boswell MS, RD, LD  Surgical Weight Loss Dietitian  [address], [address] [facility]  [place]  Phone [phone]   Fax [phone]    [facility] NUTRITION REASSESSMENT  Anthropometrics:    Ht: 57, wt: 261#, 164% IBW, 40.88 BMI  Pt has lost 9 lbs since last visit. Assessment:  Pt is 7 weeks s/p VSG. Pt is doing well with Soft diet compliance. Intake of ~60+g protein/d and ~60+oz fluid/d. Pt is waiting 30 minutes after eating to drink liquids. Pt is eating scrambled egg with cheese and onion, cheese stick with prosciutto, tuna, broccoli, ice pop; and drinking water, protein shakes. Pt is exercising via walking, rowing, resistance bands, and treadmill for 30 minutes 2-3x week. Pt is taking MVI and Ca supplements as recommended. Nutrition Diagnosis:   Altered GI function R/T wt loss surgery as evidenced by s/p VSG. Class III obesity R/T excessive energy intake as evidenced by BMI = 40.88 and 164 % IBW.  --ongoing, modified--BMI and %IBW adjusted to reflect weight loss--     Intervention:   Discussed food choices and meal ideas on Regular Bariatric diet, once released by surgeon. Encouraged pt to follow mindful eating behaviors, lean protein focus followed by non-starchy vegetables as able. Encouraged pt use protein supplements throughout the day as snacks rather than as meal replacement. Encouraged continued and increased activity. Pt goal of walking, rowing, weight resistance exercises for 30+ mins 2-3x week, and increase as tolerated. Monitoring and Evaluation:  Monitor for continued safe, supervised weight loss for VSG. Monitor pt for meeting requirements of 60-80g/d protein, 64+ fl oz/d fluids. Follow for tolerance of Regular Bariatric diet. Follow for Increased activity as tolerated.   F/U per MD Iris Boswell, MS, RD, LD

## 2022-12-01 ENCOUNTER — OFFICE VISIT (OUTPATIENT)
Dept: SURGERY | Age: 52
End: 2022-12-01

## 2022-12-01 VITALS
BODY MASS INDEX: 40.97 KG/M2 | HEART RATE: 77 BPM | HEIGHT: 67 IN | SYSTOLIC BLOOD PRESSURE: 120 MMHG | WEIGHT: 261 LBS | DIASTOLIC BLOOD PRESSURE: 64 MMHG

## 2022-12-01 DIAGNOSIS — E66.01 OBESITY, CLASS III, BMI 40-49.9 (MORBID OBESITY) (HCC): ICD-10-CM

## 2022-12-01 DIAGNOSIS — E66.01 MORBID OBESITY (HCC): Primary | ICD-10-CM

## 2022-12-01 DIAGNOSIS — Z98.84 S/P LAPAROSCOPIC SLEEVE GASTRECTOMY: ICD-10-CM

## 2022-12-01 DIAGNOSIS — Z71.82 EXERCISE COUNSELING: ICD-10-CM

## 2022-12-01 DIAGNOSIS — K59.00 CONSTIPATION, UNSPECIFIED CONSTIPATION TYPE: ICD-10-CM

## 2022-12-01 DIAGNOSIS — Z71.3 DIETARY COUNSELING: ICD-10-CM

## 2022-12-01 PROCEDURE — 99024 POSTOP FOLLOW-UP VISIT: CPT | Performed by: PHYSICIAN ASSISTANT

## 2023-01-09 NOTE — PROGRESS NOTES
Keyshawn Nava MD   Bariatric & Advanced Laparoscopic Surgery & Endoscopy  [facility] [facility] [address], [address] [facility]  [facility] [place]  Phone [phone]   Fax [phone]      Date of visit: 2023          Name: Avery Shell      MRN: [id number]       : 1970       Age: 50 y.o. Sex: female        PCP: SANFORD John NP     CC:    Chief Complaint   Patient presents with    Follow-up     Sleeve 10/10/22       HPI:    3 months post-op visit after a robotic assisted sleeve gastrectomy was done on 10/10/2022. She has lost 14 lbs since her last office visit. Weight History Graph    Surgeon: Elliot Aixa: 10/10/2022   Procedure: Sleeve   Pre-op weight: 294   Ideal body weight: 159   Excess body weight: 135     Post-Surgical Weight Loss  Date: 23  Height: 5' 7\" (170.2 cm)  Weight: 247 lb (112 kg)  BMI: 38.68  Weight Change: -14 lbs  Total Weight Change: -47 lbs  % EBWL: 35%     Evaluation of Pre-operative Co-morbid Conditions:    Sleep Apnea - Yes, uses CPAP - Yes    GERD - Yes, Treatment medication-No    Clinical Assessment and Physical Exam:    She is doing very well today and is feeling good. She reports that she has been adhering to protein first diet without difficulty. She denies any abdominal pain, nausea or vomiting or heartburn. She does not report having problems with constipation. She reports walking, stretching and dancing 2-3x per week for 20 minutes, which has decreased recently due to a back injury. Was in the ER recently for chest pain that was referred cervical pain and also having low back pain. Seeing spinal surgeon these upcoming weeks. She also has been having some swelling in the right hand in one of the veins, which has been an issue since surgery. Her PCP has asked us to investigate this further.      Protein:  60+ grams per day  Fluids:    50-64 ounces per day  Exercise:  walking, rowing and stretching 2-3x per week for 20-30 minutes  No fever or chills. Incisions well healed. Denies reflux. Denies dysphagia. Regular bowel movements. Tolerating Protein first diet without difficulty. PMH:    Past Medical History:   Diagnosis Date    Celiac disease     Chronic pain of left knee     COVID-19 03/2022    omicron variant    COVID-19 03/2021    hospitalized for 3 days    DDD (degenerative disc disease), lumbar     Endometriosis     Failed back surgical syndrome     Postsurgical changes of prior discectomy at L5/S1. Headache     History of gastroesophageal reflux (GERD)     Lumbosacral radiculopathy at S1     Lyme disease     Migraines     GLO (obstructive sleep apnea)     in process of getting CPAP    PONV (postoperative nausea and vomiting)     Prolonged emergence from general anesthesia     Vertigo     off and on \"constantly\"       PSH:    Past Surgical History:   Procedure Laterality Date    BACK SURGERY  01/31/2018    shaved down herniated disc; untangled nerve    BRACHIOPLASTY Bilateral     CHOLECYSTECTOMY  1996    SALPINGO-OOPHORECTOMY Right     SLEEVE GASTRECTOMY N/A 10/10/2022    ERAS/ GASTRECTOMY SLEEVE LAPAROSCOPIC ROBOTIC performed by Myrtle Morrison MD at [address] Left     from hand and ring finer    UPPER GASTROINTESTINAL ENDOSCOPY N/A 10/10/2022    EGD ESOPHAGOGASTRODUODENOSCOPY performed by Myrtle Morrison MD at [address]:    Current Outpatient Medications   Medication Sig Dispense Refill    Multiple Vitamins-Minerals (BARIATRIC FUSION) CHEW Take by mouth daily      sucralfate (CARAFATE) 1 GM tablet Take 1 tablet by mouth 4 times daily for 14 days Dissolve in water and drink.  56 tablet 0    omeprazole (PRILOSEC) 40 MG delayed release capsule Take 1 capsule by mouth every morning (before breakfast) 90 capsule 0    ondansetron (ZOFRAN) 4 MG tablet Take 1 tablet by mouth 3 times daily as needed for Nausea or Vomiting 30 tablet 0    ascorbic acid (VITAMIN C) 100 MG tablet by Oral/Gastric Tube route      Calcium Carbonate-Vitamin D (OYSTER SHELL CALCIUM/D) 500-200 MG-UNIT TABS by Oral/Gastric Tube route      Magnesium Gluconate 550 MG TABS Take by mouth      potassium gluconate 550 mg tablet Take by mouth      zonisamide (ZONEGRAN) 50 MG capsule Take 50 mg by mouth as needed      B Complex Vitamins (VITAMIN B COMPLEX PO) Take 1 tablet by mouth daily      MAGNESIUM-POTASSIUM PO Take by mouth      calcium citrate (CALCITRATE) 950 (200 Ca) MG tablet Take by mouth daily      carbonyl iron (FEOSOL) 45 MG TABS Take 1 tablet by mouth daily      vitamin D3 (CHOLECALCIFEROL) 125 MCG (5000 UT) TABS tablet Take 5,000 Units by mouth daily       No current facility-administered medications for this visit. ALLERGIES:      Allergies   Allergen Reactions    Adhesive Tape Itching and Rash    Gluten Diarrhea, Dizziness or Vertigo, Palpitations and Swelling       SH:    Social History     Tobacco Use    Smoking status: Never     Passive exposure: Past (Mom smoked for 6 mos when pt was a child)    Smokeless tobacco: Never   Vaping Use    Vaping Use: Never used   Substance Use Topics    Alcohol use: Never    Drug use: Never       FH:    Family History   Problem Relation Age of Onset    Stroke Mother         TIA    Asthma Mother     Osteoarthritis Mother     Lupus Mother     Kidney Disease Mother     Seizures Mother     Headache Mother     Hypertension Mother     Heart Disease Mother     Lung Disease Mother     Hypertension Father     Diabetes Father     Hypertension Paternal Grandmother     Heart Attack Paternal Grandfather 76    Breast Cancer Neg Hx        Review of systems:  The patient has no difficulty with chest pain or shortness of breath. No fever or chills. Comprehensive 13 point review of systems was otherwise unremarkable except as noted above.      Physical Exam:     /64   Pulse 72   Ht 5' 7\" (1.702 m)   Wt 247 lb (112 kg)   BMI 38.69 kg/m²     General: Well-developed, well-nourished, no distress. Psych:  Cooperative, good insight and judgement. Neuro:  Alert, oriented to person, place and time. HEENT:  Normocephalic, atraumatic. Sclera clear. Lungs:  Unlabored breathing. Symmetrical chest expansion. Chest wall:  No tenderness or deformity. Heart:  Regular rate and rhythm. No JVD. Abdomen:  Soft, non-tender, non-distended. No guarding or rebound. Well healed lap incisions. Extremities:  Extremities normal, atraumatic, no cyanosis or edema. Right hand medial vein with possible foreign body retained. Skin:  Skin color, texture, turgor normal. No rashes. Labs: All recent labs were reviewed. Imaging: All recent imaging reviewed. Diagnosis Orders   1. Morbid obesity (HCC)        2. Obesity, Class II, BMI 35-39.9        3. S/P laparoscopic sleeve gastrectomy        4. Dietary counseling        5. Exercise counseling        6. Foreign body, hand, superficial, right, initial encounter  US EXTREMITY JOINT RIGHT NON VASC COMPLETE          Assessment/Plan:  Otis Gray is a 50 y.o. female here to follow up s/p robotic assisted sleeve gastrectomy. She is doing well without any major concerns. She is adhering to the diet and we discussed and addressed all her questions. I encouraged her to continue physical activity and aim for 300 minutes a week and include 2 strength session a week to maintain the weight loss. She will continue the recommended vitamin/mineral supplementation as directed. Follow up with neurosurgery for the back pain. Will proceed with an ultrasound of the right hand to investigate possible foreign body.      RTC in 3 months         Time: I spent 40 minutes preparing to see patient (including chart review and preparation), obtaining and/or reviewing additional medical history, performing a physical exam and evaluation, documenting clinical information in the electronic health record, independently interpreting results, communicating results to patient, family or caregiver, and/or coordinating care.        Signed: Willem Crews MD  Bariatric & Minimally Invasive Surgery  1/12/2023

## 2023-01-12 ENCOUNTER — OFFICE VISIT (OUTPATIENT)
Dept: SURGERY | Age: 53
End: 2023-01-12

## 2023-01-12 VITALS
HEART RATE: 72 BPM | BODY MASS INDEX: 38.77 KG/M2 | HEIGHT: 67 IN | WEIGHT: 247 LBS | SYSTOLIC BLOOD PRESSURE: 111 MMHG | DIASTOLIC BLOOD PRESSURE: 64 MMHG

## 2023-01-12 DIAGNOSIS — Z71.82 EXERCISE COUNSELING: ICD-10-CM

## 2023-01-12 DIAGNOSIS — S60.551A FOREIGN BODY, HAND, SUPERFICIAL, RIGHT, INITIAL ENCOUNTER: ICD-10-CM

## 2023-01-12 DIAGNOSIS — E66.01 MORBID OBESITY (HCC): Primary | ICD-10-CM

## 2023-01-12 DIAGNOSIS — Z71.3 DIETARY COUNSELING: ICD-10-CM

## 2023-01-12 DIAGNOSIS — Z98.84 S/P LAPAROSCOPIC SLEEVE GASTRECTOMY: ICD-10-CM

## 2023-01-12 DIAGNOSIS — E66.9 OBESITY, CLASS II, BMI 35-39.9: ICD-10-CM

## 2023-01-12 PROCEDURE — APPSS30 APP SPLIT SHARED TIME 16-30 MINUTES: Performed by: PHYSICIAN ASSISTANT

## 2023-01-12 PROCEDURE — 99215 OFFICE O/P EST HI 40 MIN: CPT | Performed by: SURGERY

## 2023-01-12 NOTE — PROGRESS NOTES
Nedra Drew, MS, RD, LD  Surgical Weight Loss Dietitian  [address], [address] [facility]  [place]  Phone [phone]   Fax [phone]    [facility] NUTRITION REASSESSMENT  Anthropometrics:    Ht: 57, wt: 247#, 155% IBW, 38.69 BMI  Pt has lost 14 lbs since last visit. Pt is 3 months post-op VSG. Pt getting ~60+g protein/d and ~50-64oz fluid/d. Pt is eating at least 3x/d. Pt is waiting 30 minutes after eating to drink liquids. She is drinking water and protien. Pt is exercising 2-3d/w for 20 mins via walking, rowing machine, stretchy bands. Pt is taking MVI and Ca supplements as recommended, but is wanting to switch to a capsule formula. No food-related concerns at this time. Pt reports spinal injury that is limiting physical activity - followed by neurosurgeon. Diet Recall:   Breakfast: 1/2 protein shake   Lunch: chicken and rice stew with carrots   Snack: 3/4 banana with 1 tsp pb   Dinner: 10 chocolate covered almonds, 1/2 protein shake   Drinks: protein shake, water       Nutrition Diagnosis:  Class II obesity R/T excessive energy intake as evidenced by BMI = 38.69 and 155 % IBW.  --ongoing, modified--BMI and %IBW adjusted to reflect weight loss--    Inadequate fluid intake R/T unable to meet fluid recommendations as evidenced by pt s/p VSG and reports consuming ~50-64 fl oz daily. Intervention:   Evaluated diet recall and identified modifications. Encouraged lean protein focus  Encouraged incorporation of non-starchy vegetables  Encouraged practice with meal priority; protein first followed by non-starchy vegetables and complex carbohydrates  Encouraged increased fluid intake  Re-educated pt on bariatric MVI recommendations and encouraged switching to a capsule formula per options in the binder  Discussed meal/food ideas on regular bariatric diet. Encouraged continued and increased activity as tolerated.     Monitoring and Evaluation:  Monitor for continued safe, supervised weight loss for VSG. Follow for increased activity.    F/U per MD Hughes, MS, RD, LD

## 2023-01-17 ENCOUNTER — HOSPITAL ENCOUNTER (OUTPATIENT)
Dept: MRI IMAGING | Age: 53
Discharge: HOME | End: 2023-01-20
Payer: COMMERCIAL

## 2023-01-17 DIAGNOSIS — M54.16 LUMBAR RADICULOPATHY: ICD-10-CM

## 2023-01-17 DIAGNOSIS — M54.2 CERVICALGIA: ICD-10-CM

## 2023-01-17 PROCEDURE — 72148 MRI LUMBAR SPINE W/O DYE: CPT

## 2023-01-17 PROCEDURE — 72141 MRI NECK SPINE W/O DYE: CPT

## 2023-01-18 ENCOUNTER — HOSPITAL ENCOUNTER (OUTPATIENT)
Dept: ULTRASOUND IMAGING | Age: 53
Discharge: HOME | End: 2023-01-21
Payer: COMMERCIAL

## 2023-01-18 DIAGNOSIS — S60.551A FOREIGN BODY, HAND, SUPERFICIAL, RIGHT, INITIAL ENCOUNTER: ICD-10-CM

## 2023-01-18 PROCEDURE — 76882 US LMTD JT/FCL EVL NVASC XTR: CPT

## 2023-01-23 ENCOUNTER — TELEPHONE (OUTPATIENT)
Dept: CARDIOLOGY CLINIC | Age: 53
End: 2023-01-23

## 2023-01-23 NOTE — TELEPHONE ENCOUNTER
Is it okay for patient to have spinal surgery to be done March 14th / Dr Markel Prieto?  She said they told her to call us.  Phone number is [phone] and fax number is [phone].  The  is Juan J Ellington.

## 2023-01-25 NOTE — TELEPHONE ENCOUNTER
Called and informed pt of Dr Mari Garcia response, if she needs a card cl she will need a f/u appt appt with us prior to surg. Called pt to make appt however, lost phone connection. Called pt back.  Appt scheduled for 2/22/22 (ma)

## 2023-02-22 ENCOUNTER — OFFICE VISIT (OUTPATIENT)
Dept: CARDIOLOGY CLINIC | Age: 53
End: 2023-02-22
Payer: COMMERCIAL

## 2023-02-22 VITALS
DIASTOLIC BLOOD PRESSURE: 66 MMHG | HEIGHT: 67 IN | BODY MASS INDEX: 38.04 KG/M2 | WEIGHT: 242.4 LBS | HEART RATE: 88 BPM | SYSTOLIC BLOOD PRESSURE: 118 MMHG

## 2023-02-22 DIAGNOSIS — G47.33 OBSTRUCTIVE SLEEP APNEA SYNDROME: ICD-10-CM

## 2023-02-22 DIAGNOSIS — Z01.810 PREOPERATIVE CARDIOVASCULAR EXAMINATION: Primary | ICD-10-CM

## 2023-02-22 DIAGNOSIS — E66.9 OBESITY (BMI 30-39.9): ICD-10-CM

## 2023-02-22 DIAGNOSIS — I49.3 PVC (PREMATURE VENTRICULAR CONTRACTION): ICD-10-CM

## 2023-02-22 PROCEDURE — 1036F TOBACCO NON-USER: CPT | Performed by: INTERNAL MEDICINE

## 2023-02-22 PROCEDURE — G8484 FLU IMMUNIZE NO ADMIN: HCPCS | Performed by: INTERNAL MEDICINE

## 2023-02-22 PROCEDURE — 99214 OFFICE O/P EST MOD 30 MIN: CPT | Performed by: INTERNAL MEDICINE

## 2023-02-22 PROCEDURE — G8417 CALC BMI ABV UP PARAM F/U: HCPCS | Performed by: INTERNAL MEDICINE

## 2023-02-22 PROCEDURE — 3017F COLORECTAL CA SCREEN DOC REV: CPT | Performed by: INTERNAL MEDICINE

## 2023-02-22 PROCEDURE — G8427 DOCREV CUR MEDS BY ELIG CLIN: HCPCS | Performed by: INTERNAL MEDICINE

## 2023-02-22 RX ORDER — SENNOSIDES 8.6 MG
650 CAPSULE ORAL EVERY 8 HOURS PRN
COMMUNITY

## 2023-02-22 ASSESSMENT — ENCOUNTER SYMPTOMS
BACK PAIN: 1
WHEEZING: 0
HOARSE VOICE: 0
HEMATEMESIS: 0
STRIDOR: 0
HEMOPTYSIS: 0
EYE REDNESS: 0
ABDOMINAL PAIN: 0
DOUBLE VISION: 0
HEMATOCHEZIA: 0

## 2023-02-22 NOTE — PROGRESS NOTES
[facility] CARDIOLOGY  [address] [facility], [address]  PHONE: [phone]          23    NAME:  Naty Guerrero  : 1970  MRN: [id number]         SUBJECTIVE:   Naty Guerrero is a 50 y.o. female seen for a visit regarding the following:     Chief Complaint   Patient presents with    Cardiac Clearance     Spinal Surgery           HPI:    Cardio problem list:  1. Preoperative cardiovascular exam  2. Morbid obesity-s/p gastric sleeve in   3. Obstructive sleep apnea  4. Palpitations-history of PACs and PVCs in the past      I saw Ms. Samantha Mina who is a pleasant 79-year-old woman in cardiovascular follow-up for preoperative cardiovascular exam.  She has plans to undergo back surgery within the near future. She has underlying sleep apnea and obesity as risk factors for coronary artery disease.    -She was last seen by us 6 months ago in 2022 at which time we saw her in preoperative risk assessment prior to bariatric surgery. She underwent gastric sleeve placement and at that time when she saw us was 302 pounds and has lost over 50 pounds. Preoperative exam: No complaints of any chest pain or any significant worsening dyspnea on exertion orthopnea or PND. She says she uses her CPAP machine usually but lately needs parts for the machine and is awaiting it. Denies any excessive daytime drowsiness or fatigue at this point.  -No significant lower extremity edema. Baseline functional capacity is less than 4 METS because of chronic back pain. No previous history of hypertension or diabetes or hyperlipidemia and no strong family history of premature coronary artery disease or sudden cardiac death. Baseline EKG shows sinus rhythm with no concerning findings apart from right axis deviation which is likely from her underlying sleep apnea and right heart strain from it.   No significant lower extremity edema for abdominal distention no pleural effusions Palpitations: Has had occasional PVCs and PACs in the past which was diagnosed while she was in [place]. No significant palpitations or presyncope or syncope or TIAs or strokelike symptoms    Past Medical History, Past Surgical History, Family history, Social History, and Medications were all reviewed with the patient today and updated as necessary. Allergies   Allergen Reactions    Adhesive Tape Itching and Rash    Gluten Diarrhea, Dizziness or Vertigo, Palpitations and Swelling     Patient Active Problem List   Diagnosis    DDD (degenerative disc disease), lumbar    Lumbar radiculopathy    Obesity (BMI 30-39. 9)    Sleep apnea    Failed back surgical syndrome    History of Lyme disease    Celiac disease    PVC (premature ventricular contraction)     Past Medical History:   Diagnosis Date    Celiac disease     Chronic pain of left knee     COVID-19 03/2022    omicron variant    COVID-19 03/2021    hospitalized for 3 days    DDD (degenerative disc disease), lumbar     Endometriosis     Failed back surgical syndrome     Postsurgical changes of prior discectomy at L5/S1.     Headache     History of gastroesophageal reflux (GERD)     Lumbosacral radiculopathy at S1     Lyme disease     Migraines     GLO (obstructive sleep apnea)     in process of getting CPAP    PONV (postoperative nausea and vomiting)     Prolonged emergence from general anesthesia     Vertigo     off and on \"constantly\"     Past Surgical History:   Procedure Laterality Date    BACK SURGERY  01/31/2018    shaved down herniated disc; untangled nerve    BRACHIOPLASTY Bilateral     CHOLECYSTECTOMY  1996    SALPINGO-OOPHORECTOMY Right     SLEEVE GASTRECTOMY N/A 10/10/2022    ERAS/ GASTRECTOMY SLEEVE LAPAROSCOPIC ROBOTIC performed by Ramin Muñiz MD at [address] Left     from hand and ring finer    UPPER GASTROINTESTINAL ENDOSCOPY N/A 10/10/2022    EGD ESOPHAGOGASTRODUODENOSCOPY performed by Ramin Muñiz MD at [facility]     Family History   Problem Relation Age of Onset    Stroke Mother         TIA    Asthma Mother     Osteoarthritis Mother     Lupus Mother     Kidney Disease Mother     Seizures Mother     Headache Mother     Hypertension Mother     Heart Disease Mother     Lung Disease Mother     Hypertension Father     Diabetes Father     Hypertension Paternal Grandmother     Heart Attack Paternal Grandfather 76    Breast Cancer Neg Hx      Social History     Tobacco Use    Smoking status: Never     Passive exposure: Past (Mom smoked for 6 mos when pt was a child)    Smokeless tobacco: Never   Substance Use Topics    Alcohol use: Never     Current Outpatient Medications   Medication Sig Dispense Refill    acetaminophen (TYLENOL 8 HOUR ARTHRITIS PAIN) 650 MG extended release tablet Take 650 mg by mouth every 8 hours as needed for Pain      Multiple Vitamins-Minerals (BARIATRIC FUSION) CHEW Take by mouth daily      omeprazole (PRILOSEC) 40 MG delayed release capsule Take 1 capsule by mouth every morning (before breakfast) 90 capsule 0    ondansetron (ZOFRAN) 4 MG tablet Take 1 tablet by mouth 3 times daily as needed for Nausea or Vomiting 30 tablet 0    ascorbic acid (VITAMIN C) 100 MG tablet by Oral/Gastric Tube route      Calcium Carbonate-Vitamin D (OYSTER SHELL CALCIUM/D) 500-200 MG-UNIT TABS by Oral/Gastric Tube route      zonisamide (ZONEGRAN) 50 MG capsule Take 50 mg by mouth as needed      B Complex Vitamins (VITAMIN B COMPLEX PO) Take 1 tablet by mouth daily      MAGNESIUM-POTASSIUM PO Take by mouth      calcium citrate (CALCITRATE) 950 (200 Ca) MG tablet Take by mouth daily      carbonyl iron (FEOSOL) 45 MG TABS Take 1 tablet by mouth daily      vitamin D3 (CHOLECALCIFEROL) 125 MCG (5000 UT) TABS tablet Take 5,000 Units by mouth daily      potassium gluconate 550 mg tablet Take by mouth       No current facility-administered medications for this visit.        Review of Systems   Constitutional: Negative for chills and fever. HENT:  Negative for ear discharge, hoarse voice and stridor. Eyes:  Negative for double vision and redness. Cardiovascular:  Negative for cyanosis and syncope. Respiratory:  Negative for hemoptysis and wheezing. Endocrine: Negative for polydipsia and polyphagia. Hematologic/Lymphatic: Negative for adenopathy. Skin:  Negative for itching and rash. Musculoskeletal:  Positive for back pain. Negative for joint swelling and muscle weakness. Gastrointestinal:  Negative for abdominal pain, hematemesis and hematochezia. Genitourinary:  Negative for flank pain and nocturia. Neurological:  Negative for focal weakness and seizures. Psychiatric/Behavioral:  Negative for altered mental status and suicidal ideas. Allergic/Immunologic: Negative for hives. PHYSICAL EXAM:    /66   Pulse 88   Ht 5' 7\" (1.702 m)   Wt 242 lb 6.4 oz (110 kg)   BMI 37.97 kg/m²      Physical Exam    General: Alert and oriented in no acute distress  HEENT: Head is normocephalic, atraumatic, pupils are equal bilaterally, throat appears to be clear  Neck: No significant jugular venous distention no cervical bruits  Cardiovascular: S1 and S2 heard, regular rate and rhythm, no significant murmurs rubs or gallops. Respiratory: Clear to auscultation bilaterally with no adventitious sounds, respirations are normal  Abdomen: Soft, nontender, nondistended, bowel sounds present. Extremities: No cyanosis clubbing or edema  Peripheral pulses: Bilateral radial artery pulses are palpated. Bilateral pedal pulses are well felt. Neuro: No facial droop and no gross focal motor deficits  Lymphatic: No significant cervical lymphadenopathy noted. Musculoskeletal: No significant redness or swelling noted in all exposed joints. Skin: No significant rashes noted the of the exposed regions. Medical problems and test results were reviewed with the patient today.      No results found for this or any previous visit (from the past 672 hour(s)). No results found for: CHOL, CHOLPOCT, CHOLX, CHLST, CHOLV, HDL, HDLPOC, HDLC, LDL, LDLC, VLDLC, VLDL, TGLX, TRIGL,hemoglobin, basic metabolic panel, No results found for: TSH, TSH2, TSH3 ,  Lab Results   Component Value Date/Time     10/11/2022 05:16 AM    K 4.0 10/11/2022 05:16 AM     10/11/2022 05:16 AM    CO2 27 10/11/2022 05:16 AM    BUN 8 10/11/2022 05:16 AM    GFRAA >60 09/26/2022 02:09 PM      No results found for: LDLCALC, LDLCHOLESTEROL, LDLDIRECT   Lab Results   Component Value Date    CREATININE 0.52 (L) 10/11/2022      No results found for this or any previous visit. Lab Results   Component Value Date    HGB 11.0 (L) 10/11/2022      Lab Results   Component Value Date     10/11/2022        ASSESSMENT and PLAN    Preoperative cardiovascular examination  --Okay to proceed with back surgery with low risk for major adverse cardiovascular outcomes for this intermediate risk type of surgery. Obstructive sleep apnea syndrome  -Continue CPAP therapy    Obesity (BMI 30-39.9)  -Has lost 15 pounds since she last met with us-has done well following bariatric surgery    PVC (premature ventricular contraction)   -Only rare episodes-not concerning overall. No TIAs or strokelike symptoms. In sinus rhythm today and doing well. Overall Impression  -She has done well from a cardiac perspective since we last met with. She has lost 58 pounds after bariatric surgery and is doing well but still continues to have neck pain despite this weight loss. Plans for laminectomy/spinal surgery within the near future-no cardiac reason to hold her back from the surgery. She would be at acceptable/low risk for major adverse cardiovascular outcomes for this intermediate risk type of surgery. We will need to see her in follow-up on an as-needed basis. Given results of CT scan of the chest from [facility] in December we reviewed through care everywhere.   Cardiac markers, BNP were all normal along with electrolytes and overall blood levels. Return if symptoms worsen or fail to improve. Thank you for allowing us to participate in the care of your patient. If you have any further questions, please do not hesitate to contact us.   Sincerely,        Chantelle Lewis MD   2/22/2023

## 2023-04-26 ENCOUNTER — OFFICE VISIT (OUTPATIENT)
Dept: SURGERY | Age: 53
End: 2023-04-26

## 2023-04-26 VITALS
BODY MASS INDEX: 35.94 KG/M2 | SYSTOLIC BLOOD PRESSURE: 98 MMHG | DIASTOLIC BLOOD PRESSURE: 64 MMHG | HEIGHT: 67 IN | WEIGHT: 229 LBS | HEART RATE: 74 BPM

## 2023-04-26 DIAGNOSIS — Z71.82 EXERCISE COUNSELING: ICD-10-CM

## 2023-04-26 DIAGNOSIS — E66.9 OBESITY, CLASS II, BMI 35-39.9: ICD-10-CM

## 2023-04-26 DIAGNOSIS — Z98.84 S/P LAPAROSCOPIC SLEEVE GASTRECTOMY: ICD-10-CM

## 2023-04-26 DIAGNOSIS — Z71.3 DIETARY COUNSELING: ICD-10-CM

## 2023-04-26 DIAGNOSIS — E66.01 MORBID OBESITY (HCC): Primary | ICD-10-CM

## 2023-04-26 PROCEDURE — 99215 OFFICE O/P EST HI 40 MIN: CPT | Performed by: SURGERY

## 2023-04-26 PROCEDURE — APPSS30 APP SPLIT SHARED TIME 16-30 MINUTES: Performed by: PHYSICIAN ASSISTANT

## 2023-09-27 ENCOUNTER — HOSPITAL ENCOUNTER (OUTPATIENT)
Dept: MAMMOGRAPHY | Age: 53
Discharge: HOME | End: 2023-09-30
Payer: COMMERCIAL

## 2023-09-27 DIAGNOSIS — R92.8 OTHER ABNORMAL AND INCONCLUSIVE FINDINGS ON DIAGNOSTIC IMAGING OF BREAST: ICD-10-CM

## 2023-09-27 PROCEDURE — 76642 ULTRASOUND BREAST LIMITED: CPT

## 2023-09-27 PROCEDURE — 77066 DX MAMMO INCL CAD BI: CPT

## 2023-10-02 NOTE — PROGRESS NOTES
Beena Pearson MD   Bariatric & Advanced Laparoscopic Surgery & Endoscopy  [address] [place] [address], [address] [place] [address], [address] [place] [address]  Phone [phone]   Fax [phone]      Date of visit: 10/4/2023          Name: Kushal Cassidy      MRN: [id number]       : 1970       Age: 50 y.o. Sex: female        PCP: SANFORD Gabriel NP     CC:    Chief Complaint   Patient presents with    Follow-up     FU SP SLEEVE 10/10/22       HPI:    1 year post-op visit after a robotic assisted sleeve gastrectomy was done on 10/10/2022. She has remained weight stable since her last office visit. Weight History Graph    Surgeon: Costa Melendez   DOS: 10/10/2022   Procedure: Sleeve   Pre-op weight: 294   Ideal body weight: 159   Excess body weight: 135     Post-Surgical Weight Loss  Date: 10/04/23  Height: 5' 7\" (170.2 cm)  Weight: 229 lb (103.9 kg)  BMI: 35.86  Weight Change: 0 lbs  Total Weight Change: -65 lbs  % EBWL: 48%     Evaluation of Pre-operative Co-morbid Conditions:    Sleep Apnea - Yes, uses CPAP - Yes    GERD - Yes, Treatment medication-None    Clinical Assessment and Physical Exam:    She is doing very well today and is feeling good. She reports that she has been adhering to protein first diet without difficulty. She denies any abdominal pain, nausea or vomiting. She has occasional heartburn with garlic. She does report having problems with constipation and diarrhea due to Celiac disease. She reports doing light cardio 6x per week for 20-40 minutes. Protein:  80+ grams per day  Fluids:    60+ ounces per day  Exercise:  light cardio 20-40 minutes, pilates 2x per week  No fever or chills. Incisions well healed. Denies reflux. Denies dysphagia. Regular bowel movements. Tolerating Protein first diet without difficulty.       PMH:    Past Medical History:   Diagnosis Date    Celiac disease     Chronic pain of left knee     COVID-19 2022    omicron variant

## 2023-10-04 ENCOUNTER — OFFICE VISIT (OUTPATIENT)
Dept: SURGERY | Age: 53
End: 2023-10-04

## 2023-10-04 VITALS
SYSTOLIC BLOOD PRESSURE: 116 MMHG | WEIGHT: 229 LBS | BODY MASS INDEX: 35.94 KG/M2 | DIASTOLIC BLOOD PRESSURE: 67 MMHG | HEART RATE: 70 BPM | HEIGHT: 67 IN

## 2023-10-04 DIAGNOSIS — Z71.3 DIETARY COUNSELING: ICD-10-CM

## 2023-10-04 DIAGNOSIS — Z98.84 S/P LAPAROSCOPIC SLEEVE GASTRECTOMY: ICD-10-CM

## 2023-10-04 DIAGNOSIS — Z71.82 EXERCISE COUNSELING: ICD-10-CM

## 2023-10-04 DIAGNOSIS — E66.01 MORBID OBESITY (HCC): Primary | ICD-10-CM

## 2023-10-04 DIAGNOSIS — E66.9 OBESITY, CLASS II, BMI 35-39.9: ICD-10-CM

## 2023-10-04 PROBLEM — M47.9 SPONDYLOSIS: Status: ACTIVE | Noted: 2021-04-27

## 2023-10-04 PROBLEM — A69.20 LYME DISEASE: Status: ACTIVE | Noted: 2021-05-12

## 2023-10-04 PROBLEM — M77.9 TENDINITIS: Status: ACTIVE | Noted: 2021-06-10

## 2023-10-04 PROBLEM — Z98.890 HX OF LUMBAR DISCECTOMY: Status: ACTIVE | Noted: 2023-03-14

## 2023-10-04 PROBLEM — M43.10 SPONDYLOLISTHESIS: Status: ACTIVE | Noted: 2023-10-04

## 2023-10-04 PROBLEM — G47.33 OBSTRUCTIVE SLEEP APNEA SYNDROME: Status: ACTIVE | Noted: 2022-05-18

## 2023-10-04 PROBLEM — U07.1 COVID-19: Status: ACTIVE | Noted: 2022-03-31

## 2023-10-04 PROBLEM — Z86.16 HISTORY OF SEVERE ACUTE RESPIRATORY SYNDROME CORONAVIRUS 2 (SARS-COV-2) DISEASE: Status: ACTIVE | Noted: 2021-06-10

## 2023-10-04 PROBLEM — M54.50 LOW BACK PAIN: Status: ACTIVE | Noted: 2021-10-06

## 2023-10-04 PROBLEM — M48.00 SPINAL STENOSIS: Status: ACTIVE | Noted: 2021-05-12

## 2023-10-04 PROCEDURE — 99215 OFFICE O/P EST HI 40 MIN: CPT | Performed by: SURGERY

## 2023-10-04 PROCEDURE — 3017F COLORECTAL CA SCREEN DOC REV: CPT | Performed by: SURGERY

## 2023-10-04 PROCEDURE — 1036F TOBACCO NON-USER: CPT | Performed by: SURGERY

## 2023-10-04 PROCEDURE — APPSS30 APP SPLIT SHARED TIME 16-30 MINUTES: Performed by: PHYSICIAN ASSISTANT

## 2023-12-12 ENCOUNTER — OFFICE VISIT (OUTPATIENT)
Dept: SURGERY | Age: 53
End: 2023-12-12
Payer: COMMERCIAL

## 2023-12-12 VITALS
HEART RATE: 70 BPM | SYSTOLIC BLOOD PRESSURE: 141 MMHG | DIASTOLIC BLOOD PRESSURE: 72 MMHG | BODY MASS INDEX: 36.1 KG/M2 | HEIGHT: 67 IN | WEIGHT: 230 LBS

## 2023-12-12 DIAGNOSIS — E66.9 OBESITY, CLASS II, BMI 35-39.9: ICD-10-CM

## 2023-12-12 DIAGNOSIS — Z87.19 HX OF GASTROESOPHAGEAL REFLUX (GERD): ICD-10-CM

## 2023-12-12 DIAGNOSIS — Z98.84 S/P LAPAROSCOPIC SLEEVE GASTRECTOMY: Primary | ICD-10-CM

## 2023-12-12 DIAGNOSIS — I49.3 PVC (PREMATURE VENTRICULAR CONTRACTION): ICD-10-CM

## 2023-12-12 DIAGNOSIS — G47.33 OSA (OBSTRUCTIVE SLEEP APNEA): ICD-10-CM

## 2023-12-12 DIAGNOSIS — G43.919 INTRACTABLE MIGRAINE WITHOUT STATUS MIGRAINOSUS, UNSPECIFIED MIGRAINE TYPE: ICD-10-CM

## 2023-12-12 DIAGNOSIS — Z71.82 EXERCISE COUNSELING: ICD-10-CM

## 2023-12-12 DIAGNOSIS — Z71.3 DIETARY COUNSELING: ICD-10-CM

## 2023-12-12 PROCEDURE — 99215 OFFICE O/P EST HI 40 MIN: CPT | Performed by: SURGERY

## 2023-12-12 PROCEDURE — G8417 CALC BMI ABV UP PARAM F/U: HCPCS | Performed by: SURGERY

## 2023-12-12 PROCEDURE — 1036F TOBACCO NON-USER: CPT | Performed by: SURGERY

## 2023-12-12 PROCEDURE — G8484 FLU IMMUNIZE NO ADMIN: HCPCS | Performed by: SURGERY

## 2023-12-12 PROCEDURE — 3017F COLORECTAL CA SCREEN DOC REV: CPT | Performed by: SURGERY

## 2023-12-12 PROCEDURE — G8427 DOCREV CUR MEDS BY ELIG CLIN: HCPCS | Performed by: SURGERY

## 2023-12-13 DIAGNOSIS — Z71.3 DIETARY COUNSELING: ICD-10-CM

## 2023-12-13 DIAGNOSIS — Z98.84 S/P LAPAROSCOPIC SLEEVE GASTRECTOMY: ICD-10-CM

## 2023-12-13 DIAGNOSIS — E66.9 OBESITY, CLASS II, BMI 35-39.9: ICD-10-CM

## 2023-12-13 DIAGNOSIS — G43.919 INTRACTABLE MIGRAINE WITHOUT STATUS MIGRAINOSUS, UNSPECIFIED MIGRAINE TYPE: ICD-10-CM

## 2023-12-13 DIAGNOSIS — G47.33 OSA (OBSTRUCTIVE SLEEP APNEA): ICD-10-CM

## 2023-12-13 DIAGNOSIS — I49.3 PVC (PREMATURE VENTRICULAR CONTRACTION): ICD-10-CM

## 2023-12-13 DIAGNOSIS — Z71.82 EXERCISE COUNSELING: ICD-10-CM

## 2023-12-13 DIAGNOSIS — Z87.19 HX OF GASTROESOPHAGEAL REFLUX (GERD): ICD-10-CM

## 2023-12-13 LAB
25(OH)D3 SERPL-MCNC: 31.2 NG/ML (ref 30–100)
ALBUMIN SERPL-MCNC: 3.8 G/DL (ref 3.5–5)
ALBUMIN/GLOB SERPL: 1.1 (ref 0.4–1.6)
ALP SERPL-CCNC: 57 U/L (ref 50–136)
ALT SERPL-CCNC: 16 U/L (ref 12–65)
ANION GAP SERPL CALC-SCNC: 3 MMOL/L (ref 2–11)
AST SERPL-CCNC: 14 U/L (ref 15–37)
BASOPHILS # BLD: 0 K/UL (ref 0–0.2)
BASOPHILS NFR BLD: 0 % (ref 0–2)
BILIRUB SERPL-MCNC: 0.5 MG/DL (ref 0.2–1.1)
BUN SERPL-MCNC: 10 MG/DL (ref 6–23)
CALCIUM SERPL-MCNC: 9 MG/DL (ref 8.3–10.4)
CHLORIDE SERPL-SCNC: 107 MMOL/L (ref 103–113)
CHOLEST SERPL-MCNC: 163 MG/DL
CO2 SERPL-SCNC: 31 MMOL/L (ref 21–32)
CREAT SERPL-MCNC: 0.6 MG/DL (ref 0.6–1)
DIFFERENTIAL METHOD BLD: NORMAL
EOSINOPHIL # BLD: 0.1 K/UL (ref 0–0.8)
EOSINOPHIL NFR BLD: 2 % (ref 0.5–7.8)
ERYTHROCYTE [DISTWIDTH] IN BLOOD BY AUTOMATED COUNT: 12.9 % (ref 11.9–14.6)
EST. AVERAGE GLUCOSE BLD GHB EST-MCNC: 103 MG/DL
GLOBULIN SER CALC-MCNC: 3.4 G/DL (ref 2.8–4.5)
GLUCOSE SERPL-MCNC: 86 MG/DL (ref 65–100)
HBA1C MFR BLD: 5.2 % (ref 4.8–5.6)
HCT VFR BLD AUTO: 39.5 % (ref 35.8–46.3)
HDLC SERPL-MCNC: 75 MG/DL (ref 40–60)
HDLC SERPL: 2.2
HGB BLD-MCNC: 12.7 G/DL (ref 11.7–15.4)
IMM GRANULOCYTES # BLD AUTO: 0 K/UL (ref 0–0.5)
IMM GRANULOCYTES NFR BLD AUTO: 0 % (ref 0–5)
LDLC SERPL CALC-MCNC: 75.6 MG/DL
LYMPHOCYTES # BLD: 2.1 K/UL (ref 0.5–4.6)
LYMPHOCYTES NFR BLD: 33 % (ref 13–44)
MCH RBC QN AUTO: 30.9 PG (ref 26.1–32.9)
MCHC RBC AUTO-ENTMCNC: 32.2 G/DL (ref 31.4–35)
MCV RBC AUTO: 96.1 FL (ref 82–102)
MONOCYTES # BLD: 0.4 K/UL (ref 0.1–1.3)
MONOCYTES NFR BLD: 7 % (ref 4–12)
NEUTS SEG # BLD: 3.6 K/UL (ref 1.7–8.2)
NEUTS SEG NFR BLD: 58 % (ref 43–78)
NRBC # BLD: 0 K/UL (ref 0–0.2)
PLATELET # BLD AUTO: 216 K/UL (ref 150–450)
PMV BLD AUTO: 10.3 FL (ref 9.4–12.3)
POTASSIUM SERPL-SCNC: 4.1 MMOL/L (ref 3.5–5.1)
PROT SERPL-MCNC: 7.2 G/DL (ref 6.3–8.2)
RBC # BLD AUTO: 4.11 M/UL (ref 4.05–5.2)
SODIUM SERPL-SCNC: 141 MMOL/L (ref 136–146)
TRIGL SERPL-MCNC: 62 MG/DL (ref 35–150)
TSH, 3RD GENERATION: 1.01 UIU/ML (ref 0.36–3.74)
VLDLC SERPL CALC-MCNC: 12.4 MG/DL (ref 6–23)
WBC # BLD AUTO: 6.3 K/UL (ref 4.3–11.1)

## 2023-12-15 ENCOUNTER — HOSPITAL ENCOUNTER (OUTPATIENT)
Dept: NON INVASIVE DIAGNOSTICS | Age: 53
Discharge: HOME | End: 2023-12-15
Payer: COMMERCIAL

## 2023-12-15 DIAGNOSIS — I49.3 PVC (PREMATURE VENTRICULAR CONTRACTION): ICD-10-CM

## 2023-12-15 DIAGNOSIS — G43.919 INTRACTABLE MIGRAINE WITHOUT STATUS MIGRAINOSUS, UNSPECIFIED MIGRAINE TYPE: ICD-10-CM

## 2023-12-15 DIAGNOSIS — Z87.19 HX OF GASTROESOPHAGEAL REFLUX (GERD): ICD-10-CM

## 2023-12-15 DIAGNOSIS — Z71.3 DIETARY COUNSELING: ICD-10-CM

## 2023-12-15 DIAGNOSIS — E66.9 OBESITY, CLASS II, BMI 35-39.9: ICD-10-CM

## 2023-12-15 DIAGNOSIS — Z98.84 S/P LAPAROSCOPIC SLEEVE GASTRECTOMY: ICD-10-CM

## 2023-12-15 DIAGNOSIS — G47.33 OSA (OBSTRUCTIVE SLEEP APNEA): ICD-10-CM

## 2023-12-15 DIAGNOSIS — Z71.82 EXERCISE COUNSELING: ICD-10-CM

## 2023-12-15 LAB
EKG ATRIAL RATE: 79 BPM
EKG DIAGNOSIS: NORMAL
EKG P AXIS: 52 DEGREES
EKG P-R INTERVAL: 174 MS
EKG Q-T INTERVAL: 380 MS
EKG QRS DURATION: 90 MS
EKG QTC CALCULATION (BAZETT): 435 MS
EKG R AXIS: 73 DEGREES
EKG T AXIS: 49 DEGREES
EKG VENTRICULAR RATE: 79 BPM

## 2023-12-15 PROCEDURE — 93005 ELECTROCARDIOGRAM TRACING: CPT

## 2023-12-19 ENCOUNTER — CLINICAL DOCUMENTATION (OUTPATIENT)
Dept: CARDIAC REHAB | Age: 53
End: 2023-12-19

## 2023-12-19 NOTE — PROGRESS NOTES
Patient seen this date at [facility] for a fitness assessment.  Six minute exercise test completed - walking track discontinued d/t back pain, test finished on recumbent NuStep machine. Advised on exercises to benefit strength and cautioned against exercises that might exacerbate back pain. She stated that she will use the facility during the month of membership.

## 2024-01-10 ENCOUNTER — OFFICE VISIT (OUTPATIENT)
Dept: SURGERY | Age: 54
End: 2024-01-10
Payer: COMMERCIAL

## 2024-01-10 DIAGNOSIS — Z98.84 S/P LAPAROSCOPIC SLEEVE GASTRECTOMY: ICD-10-CM

## 2024-01-10 DIAGNOSIS — I49.3 PVC (PREMATURE VENTRICULAR CONTRACTION): ICD-10-CM

## 2024-01-10 DIAGNOSIS — G47.33 OSA (OBSTRUCTIVE SLEEP APNEA): ICD-10-CM

## 2024-01-10 DIAGNOSIS — G43.919 INTRACTABLE MIGRAINE WITHOUT STATUS MIGRAINOSUS, UNSPECIFIED MIGRAINE TYPE: ICD-10-CM

## 2024-01-10 DIAGNOSIS — Z87.19 HX OF GASTROESOPHAGEAL REFLUX (GERD): ICD-10-CM

## 2024-01-10 DIAGNOSIS — E66.9 OBESITY, CLASS II, BMI 35-39.9: Primary | ICD-10-CM

## 2024-01-10 DIAGNOSIS — Z71.82 EXERCISE COUNSELING: ICD-10-CM

## 2024-01-10 DIAGNOSIS — Z71.3 NUTRITIONAL COUNSELING: ICD-10-CM

## 2024-01-10 PROCEDURE — 1036F TOBACCO NON-USER: CPT | Performed by: SURGERY

## 2024-01-10 PROCEDURE — 99215 OFFICE O/P EST HI 40 MIN: CPT | Performed by: SURGERY

## 2024-01-10 PROCEDURE — G8428 CUR MEDS NOT DOCUMENT: HCPCS | Performed by: SURGERY

## 2024-01-10 PROCEDURE — G8417 CALC BMI ABV UP PARAM F/U: HCPCS | Performed by: SURGERY

## 2024-01-10 PROCEDURE — 3017F COLORECTAL CA SCREEN DOC REV: CPT | Performed by: SURGERY

## 2024-01-10 PROCEDURE — G8484 FLU IMMUNIZE NO ADMIN: HCPCS | Performed by: SURGERY

## 2024-01-10 NOTE — PROGRESS NOTES
[facility] NUTRITION REASSESSMENT     Assessment:    Pt reports good dietary compliance since last office visit. Pt is eating at least 3x/d. Pt is drinking water and protein shakes. Pt is exercising via aerobic exercises and light wt training for 30-40min 4 x weekly.   Diet Recall:              Breakfast: 2 cheese sticks              Lunch: Tuna fish with GF crackers              Dinner: Chicken wings, veggie sticks, few bites of birthday cake (not a common thing)     Intervention:   Evaluated diet recall,  Encouraged continued activity.          Monitoring and Evaluation:  Monitor for continued safe, supervised weight loss for OM.    F/U per MD Tena Lynn MBA, Nutritionist, RD eligible   
Itching and Rash    Gluten Diarrhea, Dizziness or Vertigo, Palpitations and Swelling       ROS: The patient has no difficulty with chest pain or shortness of breath.  No fever or chills.  Comprehensive 13 point review of systems was otherwise unremarkable except as noted above.    Physical Exam:     There were no vitals taken for this visit.       General:  Well-developed, well-nourished, no distress.  Psych:  Cooperative, good insight and judgement.  Neuro:  Alert, oriented to person, place and time.  HEENT:  Normocephalic, atraumatic. Sclera clear.  Lungs:  Unlabored breathing. Symmetrical chest expansion.   Chest wall:  No tenderness or deformity.  Heart:  Regular rate and rhythm. No JVD.  Abdomen:  Soft, non-tender, non-distended. No guarding or rebound.    Extremities:  Extremities normal, atraumatic, no cyanosis or edema.  Skin:  Skin color, texture, turgor normal. No rashes.    ASSESSMENT:  Morbid obesity with a There is no height or weight on file to calculate BMI. following up in our multi-disciplinary weight loss prep program.    PLAN:  We have discussed the patient's participation and reviewed the steps in our weight loss program. She has had a long history of failed diet and exercise programs and has good understanding about the risks, benefits, and commitment related to medical weight loss.    She is interested in proceeding with our program seeking the incorporation of medication for medical weight loss to assist with her co morbidities.       Diagnosis Orders   1. Obesity, Class II, BMI 35-39.9        2. S/P laparoscopic sleeve gastrectomy        3. Nutritional counseling        4. Exercise counseling        5. Hx of gastroesophageal reflux (GERD)        6. GLO (obstructive sleep apnea)        7. Intractable migraine without status migrainosus, unspecified migraine type        8. PVC (premature ventricular contraction)              1.  We discussed her labs results. Most of the labs were unremarkable.

## 2024-02-06 ENCOUNTER — OFFICE VISIT (OUTPATIENT)
Dept: SURGERY | Age: 54
End: 2024-02-06
Payer: COMMERCIAL

## 2024-02-06 VITALS
BODY MASS INDEX: 35.94 KG/M2 | HEART RATE: 72 BPM | HEIGHT: 67 IN | SYSTOLIC BLOOD PRESSURE: 144 MMHG | DIASTOLIC BLOOD PRESSURE: 83 MMHG | WEIGHT: 229 LBS

## 2024-02-06 DIAGNOSIS — Z71.3 DIETARY COUNSELING: ICD-10-CM

## 2024-02-06 DIAGNOSIS — Z71.82 EXERCISE COUNSELING: ICD-10-CM

## 2024-02-06 DIAGNOSIS — E66.01 MORBID OBESITY (HCC): ICD-10-CM

## 2024-02-06 DIAGNOSIS — E66.9 OBESITY, CLASS II, BMI 35-39.9: Primary | ICD-10-CM

## 2024-02-06 DIAGNOSIS — Z98.84 S/P LAPAROSCOPIC SLEEVE GASTRECTOMY: ICD-10-CM

## 2024-02-06 DIAGNOSIS — Z71.3 NUTRITIONAL COUNSELING: ICD-10-CM

## 2024-02-06 DIAGNOSIS — G47.33 OSA (OBSTRUCTIVE SLEEP APNEA): ICD-10-CM

## 2024-02-06 PROCEDURE — 99215 OFFICE O/P EST HI 40 MIN: CPT | Performed by: SURGERY

## 2024-02-06 PROCEDURE — G8428 CUR MEDS NOT DOCUMENT: HCPCS | Performed by: SURGERY

## 2024-02-06 PROCEDURE — 3017F COLORECTAL CA SCREEN DOC REV: CPT | Performed by: SURGERY

## 2024-02-06 PROCEDURE — G8417 CALC BMI ABV UP PARAM F/U: HCPCS | Performed by: SURGERY

## 2024-02-06 PROCEDURE — 1036F TOBACCO NON-USER: CPT | Performed by: SURGERY

## 2024-02-06 PROCEDURE — G8484 FLU IMMUNIZE NO ADMIN: HCPCS | Performed by: SURGERY

## 2024-02-06 NOTE — PROGRESS NOTES
Haley Queen MD   Bariatric & Advanced Laparoscopic Surgery & Endoscopy  [address] [facility], [address]  [place]  [address]                              Office [phone] Fax [phone]        Date of visit: 2/6/2024      History and Physical    Patient: Silva Kyle MRN: [id number]     YOB: 1970  Age: 53 y.o.  Sex: female              PCP: Elaina Palmer APRN - NP   Pharmacy:   [id number] - [place], [place] - [address] - P [phone] - F [phone] [address] [facility]  [place] [address]  Phone: [phone] Fax: [phone]     Date:  2/6/2024        1 month(s) post medication start  She has lost,  1 lbs. since her last office visit.    How is patient taking medication? Metformin 1000 BID           She has been doing well. She reports having some diarrhea initially. She also reports no changes in appetite. She does reports early satiety.    Clinical Assessment and Physical Exam:    she is doing very well today and is feeling good. She reports that she has been adhering to the protein diet without difficulty.  She denies any tachycardia, abdominal pain, nausea or vomiting.  She does not report having problems with constipation.     Protein:  60 grams per day  Fluids:    60 ounces per day  Physical Activity:  elliptical and aerobic training    Initial Consult Date 12/12/2023   Initial Weight 230   Ideal Body Weight 160   Initial BMI 36.02   Initial Neck Circumference 13.25   Initial Waist Circumference 43   Initial Hip Circumference 51.75   Initial BF% 53.4       Medication See history below   Medication Start Date 1/10/2024       Today's Weight 103.9 kg (229 lb)   Today's BMI Body mass index is 35.87 kg/m².    Today's Neck Circumference 14.75   Today's Waist 45.5   Today's Hip 52   Today's BF% 53.6       Evaluation of Co-morbid Conditions  Sleep Apnea Yes, uses CPAP- Yes   Diabetes No, insulin dependent- NA   Hypertension No,

## 2024-02-06 NOTE — PROGRESS NOTES
[facility] NUTRITION REASSESSMENT     Assessment:    Pt reports good dietary compliance since last office visit. Pt is eating at least 3x/d. Pt is drinking water and protein shakes. Pt is exercising via elliptical and aerobic walking for 30min 3 x weekly .   Diet Recall:              Breakfast: 3 chicken wings -baked              Lunch: Chicken tenderloin and 1/4c sweet potato casserole              Dinner: Cheese sticks and melon chunks     Intervention:   Evaluated diet recall and identified modifications.  Encouraged pt to be cautious with chicken skin - due to fat  Encouraged continued and increased activity.       Encouraged pt to increase frequency of exercise     Monitoring and Evaluation:  Monitor for continued safe, supervised weight loss for OM.    F/U per MD Tena Lynn MBA, Nutritionist, RD eligible

## 2025-01-09 ENCOUNTER — OFFICE VISIT (OUTPATIENT)
Dept: SURGERY | Age: 55
End: 2025-01-09

## 2025-01-09 VITALS
SYSTOLIC BLOOD PRESSURE: 131 MMHG | DIASTOLIC BLOOD PRESSURE: 72 MMHG | BODY MASS INDEX: 26.68 KG/M2 | HEART RATE: 71 BPM | WEIGHT: 170 LBS | HEIGHT: 67 IN

## 2025-01-09 DIAGNOSIS — E66.3 OVERWEIGHT (BMI 25.0-29.9): ICD-10-CM

## 2025-01-09 DIAGNOSIS — Z71.82 EXERCISE COUNSELING: ICD-10-CM

## 2025-01-09 DIAGNOSIS — Z71.3 NUTRITIONAL COUNSELING: ICD-10-CM

## 2025-01-09 DIAGNOSIS — Z87.19 HX OF GASTROESOPHAGEAL REFLUX (GERD): ICD-10-CM

## 2025-01-09 DIAGNOSIS — Z13.21 ENCOUNTER FOR VITAMIN DEFICIENCY SCREENING: ICD-10-CM

## 2025-01-09 DIAGNOSIS — Z98.84 S/P LAPAROSCOPIC SLEEVE GASTRECTOMY: Primary | ICD-10-CM

## 2025-01-09 PROBLEM — U07.1 DISEASE DUE TO SEVERE ACUTE RESPIRATORY SYNDROME CORONAVIRUS 2 (SARS-COV-2): Status: ACTIVE | Noted: 2022-02-03

## 2025-01-09 PROBLEM — M19.90 CHRONIC OSTEOARTHRITIS: Status: ACTIVE | Noted: 2021-04-27

## 2025-01-09 PROBLEM — G89.29 CHRONIC BACK PAIN: Status: ACTIVE | Noted: 2021-10-06

## 2025-01-09 PROBLEM — G62.9 NEUROPATHY: Status: ACTIVE | Noted: 2022-12-11

## 2025-01-09 PROBLEM — M54.9 CHRONIC BACK PAIN: Status: ACTIVE | Noted: 2021-10-06

## 2025-01-09 PROBLEM — J01.90 ACUTE SINUSITIS, UNSPECIFIED: Status: ACTIVE | Noted: 2021-06-10

## 2025-01-09 PROBLEM — Z22.7 LTBI (LATENT TUBERCULOSIS INFECTION): Status: ACTIVE | Noted: 2023-12-05

## 2025-01-09 PROCEDURE — 99215 OFFICE O/P EST HI 40 MIN: CPT | Performed by: PHYSICIAN ASSISTANT

## 2025-01-09 RX ORDER — OMEPRAZOLE 40 MG/1
40 CAPSULE, DELAYED RELEASE ORAL PRN
Qty: 180 CAPSULE | Refills: 0 | Status: SHIPPED | OUTPATIENT
Start: 2025-01-09

## 2025-01-09 NOTE — PROGRESS NOTES
Radha Baca PA-C  Bariatric & Advanced Laparoscopic Surgery & Endoscopy    Name: Silva Kyle      MRN: [id number]       : 1970       Sex: female  PCP: Elaina Palmer APRN - NP     Surgeon: Dr. Haley Queen  Procedure: robotic assisted sleeve gastrectomy    Surgeon: Valdo   DOS: 10/10/2022   Procedure: Sleeve   Pre-op weight: 294   Ideal body weight: 159   Excess body weight: 135      Weight History Graph  Last Surgical Weight Loss:      10/20/2022     1:02 PM 2022     1:43 PM 2022     9:10 AM 2023    10:56 AM 2023    10:23 AM 10/4/2023     9:36 AM 2025     1:48 PM   Surgical Weight Loss Tracker   Date 10/20/2022 2022 2022 2023 2023 10/4/2023 2025   Height 5' 7\" 5' 7\" 5' 7\" 5' 7\" 5' 7\" 5' 7\" 5' 7\"   Weight 275 lb 270 lb 261 lb 247 lb 229 lb 229 lb 170 lb   BMI 43.07 42.28 40.87 38.68 35.86 35.86 26.62   Weight Change -19 lb -5 lb -9 lb -14 lb -18 lb 0 lb -59 lb   Total Weight Change -19 lb -24 lb -33 lb -47 lb -65 lb -65 lb -124 lb   % EBWL 14% 18% 24% 35% 48% 48% 92%      Subjective   2 years post-op visit after a robotic assisted sleeve gastrectomy was done on 10/10/2022.   She has lost 59 lbs since her last office visit.    Clinical Assessment:    She is doing very well today and is feeling good.  She reports that she has been adhering to a regular diet without difficulty.  She denies any abdominal pain, nausea or vomiting.  She admits to occasional heartburn when eating chocolate, and takes Omeprazole as needed.  She denies fevers, chills, or any problems with the incision sites.  She does not report having problems with constipation but has intermittent diarrhea due to Metformin.     Protein:  60 grams per day  Fluids:    64 ounces per day  Exercise:  cardio and pilates 4-5x per week for 30 minutes    Evaluation of Pre-operative Co-morbid Conditions:    Sleep Apnea RS   GERD Yes, treatment medication-

## 2025-01-09 NOTE — PROGRESS NOTES
[facility] NUTRITION REASSESSMENT  Assessment:   2 years post-op VSG. Pt consuming ~60g protein/d and ~64oz fluid/d. Pt is eating at least 3x/d. Pt is drinking water. Pt is exercising via cardio and piliates for 30min 4-5 x. Pt is taking MVI and Ca supplements as recommended.    Diet Recall:   Breakfast: Egg and cheese   Lunch: Veggie minced meat soup and shrimp   Dinner: Shepherds pie     Intervention:   Evaluated diet recall.  Encouraged continued activity.        Monitoring and Evaluation:  Monitor for continued safe, supervised weight loss for VSG.   F/U per MD Tena Lynn MBA. RD, LD

## (undated) DEVICE — SUTURE VCRL SZ 3-0 L27IN ABSRB UD L26MM SH 1/2 CIR J416H

## (undated) DEVICE — STAPLER 60 RELOAD GREEN: Brand: SUREFORM

## (undated) DEVICE — COLUMN DRAPE

## (undated) DEVICE — STAPLER 60 RELOAD WHITE: Brand: SUREFORM

## (undated) DEVICE — ARM DRAPE

## (undated) DEVICE — INSUFFLATION NEEDLE TO ESTABLISH PNEUMOPERITONEUM.: Brand: INSUFFLATION NEEDLE

## (undated) DEVICE — VESSEL SEALER EXTEND: Brand: ENDOWRIST

## (undated) DEVICE — STAPLER 60: Brand: SUREFORM

## (undated) DEVICE — AIRSEAL BIFURCATED FILTERED TUBESET WITH ACTIVATED CHARCOAL FILTER: Brand: AIRSEAL

## (undated) DEVICE — SOLUTION IRRIG 3000ML 0.9% SOD CHL USP UROMATIC PLAS CONT

## (undated) DEVICE — ROBOTIC HYSTERECTOMY: Brand: MEDLINE INDUSTRIES, INC.

## (undated) DEVICE — REDUCER: Brand: ENDOWRIST

## (undated) DEVICE — INTENDED FOR TISSUE SEPARATION, AND OTHER PROCEDURES THAT REQUIRE A SHARP SURGICAL BLADE TO PUNCTURE OR CUT.: Brand: BARD-PARKER ® STAINLESS STEEL BLADES

## (undated) DEVICE — SUTURE MCRYL SZ 4-0 L27IN ABSRB UD L19MM PS-2 1/2 CIR PRIM Y426H

## (undated) DEVICE — GLOVE SURG SZ 65 THK91MIL LTX FREE SYN POLYISOPRENE

## (undated) DEVICE — BLADELESS OBTURATOR: Brand: WECK VISTA

## (undated) DEVICE — SEAL

## (undated) DEVICE — ADHESIVE SKIN CLSR 0.7ML TOP DERMBND ADV

## (undated) DEVICE — PAD PT POS 36 IN SURGYPAD DISP

## (undated) DEVICE — SHEET,DRAPE,53X77,STERILE: Brand: MEDLINE

## (undated) DEVICE — CANISTER, RIGID, 2000CC: Brand: MEDLINE INDUSTRIES, INC.

## (undated) DEVICE — APPLIER CLP M/L SHFT DIA5MM 15 LIG LIGAMAX 5

## (undated) DEVICE — AIRSEAL 8 MM CANNULA CAP AND OBTURATOR WITH BLADELESS OPTICAL TIP COMPATIBLE WITH INTUITIVE DA VINCI XI AND DA VINCI X 8 MM INSTRUMENT CANNULA, STANDARD LENGTH: Brand: AIRSEAL

## (undated) DEVICE — BINDER ABD M/L H9IN FOR 46-62IN WHT 3 SLD PNL DSGN HOOP AND

## (undated) DEVICE — STAPLER 60 RELOAD BLUE: Brand: SUREFORM

## (undated) DEVICE — GLOVE SURG SZ 7 L12IN FNGR THK79MIL GRN LTX FREE

## (undated) DEVICE — APPLICATOR MEDICATED 26 CC SOLUTION HI LT ORNG CHLORAPREP

## (undated) DEVICE — GARMENT,MEDLINE,DVT,INT,CALF,XL,GEN2: Brand: MEDLINE

## (undated) DEVICE — SUTURE SZ 0 27IN 5/8 CIR UR-6  TAPER PT VIOLET ABSRB VICRYL J603H

## (undated) DEVICE — CANNULA SEAL